# Patient Record
Sex: MALE | Race: WHITE | Employment: OTHER | URBAN - METROPOLITAN AREA
[De-identification: names, ages, dates, MRNs, and addresses within clinical notes are randomized per-mention and may not be internally consistent; named-entity substitution may affect disease eponyms.]

---

## 2017-02-20 DIAGNOSIS — I10 ESSENTIAL HYPERTENSION, BENIGN: ICD-10-CM

## 2017-02-20 NOTE — TELEPHONE ENCOUNTER
Lisinopril      Last Written Prescription Date: 06/29/16  Last Fill Quantity: 90, # refills: 1  Last Office Visit with G, P or [facility] prescribing provider: 12/26/16       Potassium   Date Value Ref Range Status   06/29/2016 4.7 3.4 - 5.3 mmol/L Final     Creatinine   Date Value Ref Range Status   06/29/2016 0.96 0.66 - 1.25 mg/dL Final     BP Readings from Last 3 Encounters:   12/26/16 122/68   11/08/16 124/83   06/29/16 126/72

## 2017-02-22 RX ORDER — LISINOPRIL 10 MG/1
10 TABLET ORAL DAILY
Qty: 90 TABLET | Refills: 1 | Status: SHIPPED | OUTPATIENT
Start: 2017-02-22 | End: 2017-07-05

## 2017-06-28 DIAGNOSIS — K21.9 ESOPHAGEAL REFLUX: ICD-10-CM

## 2017-06-28 NOTE — TELEPHONE ENCOUNTER
Omeprazole      Last Written Prescription Date: 12/16/16  Last Fill Quantity: 90,  # refills: 1   Last Office Visit with G, [facility] or [facility] prescribing provider: 12/26/16

## 2017-06-29 NOTE — TELEPHONE ENCOUNTER
Pt is due for yearly OV.   Call to pt and advised. He agrees.     Scheduled. Prescription approved per [facility] Refill Protocol.

## 2017-07-05 ENCOUNTER — OFFICE VISIT (OUTPATIENT)
Dept: INTERNAL MEDICINE | Facility: CLINIC | Age: 82
End: 2017-07-05
Payer: COMMERCIAL

## 2017-07-05 ENCOUNTER — TELEPHONE (OUTPATIENT)
Dept: INTERNAL MEDICINE | Facility: CLINIC | Age: 82
End: 2017-07-05

## 2017-07-05 VITALS
BODY MASS INDEX: 34.86 KG/M2 | HEART RATE: 82 BPM | DIASTOLIC BLOOD PRESSURE: 60 MMHG | TEMPERATURE: 97.7 F | HEIGHT: 68 IN | WEIGHT: 230 LBS | SYSTOLIC BLOOD PRESSURE: 128 MMHG | OXYGEN SATURATION: 94 %

## 2017-07-05 DIAGNOSIS — E78.5 HYPERLIPIDEMIA LDL GOAL <130: Primary | ICD-10-CM

## 2017-07-05 DIAGNOSIS — K21.9 GASTROESOPHAGEAL REFLUX DISEASE, ESOPHAGITIS PRESENCE NOT SPECIFIED: Primary | ICD-10-CM

## 2017-07-05 DIAGNOSIS — E78.5 HYPERLIPIDEMIA LDL GOAL <130: ICD-10-CM

## 2017-07-05 DIAGNOSIS — I10 ESSENTIAL HYPERTENSION, BENIGN: ICD-10-CM

## 2017-07-05 LAB — HBA1C MFR BLD: 5.8 % (ref 4.3–6)

## 2017-07-05 PROCEDURE — 99214 OFFICE O/P EST MOD 30 MIN: CPT | Performed by: INTERNAL MEDICINE

## 2017-07-05 PROCEDURE — 36415 COLL VENOUS BLD VENIPUNCTURE: CPT | Performed by: INTERNAL MEDICINE

## 2017-07-05 PROCEDURE — 83036 HEMOGLOBIN GLYCOSYLATED A1C: CPT | Mod: GZ | Performed by: INTERNAL MEDICINE

## 2017-07-05 PROCEDURE — 80061 LIPID PANEL: CPT | Performed by: INTERNAL MEDICINE

## 2017-07-05 PROCEDURE — 80053 COMPREHEN METABOLIC PANEL: CPT | Performed by: INTERNAL MEDICINE

## 2017-07-05 RX ORDER — LOVASTATIN 10 MG
10 TABLET ORAL AT BEDTIME
Qty: 90 TABLET | Refills: 3 | Status: SHIPPED | OUTPATIENT
Start: 2017-07-05 | End: 2018-08-13

## 2017-07-05 RX ORDER — LISINOPRIL 10 MG/1
10 TABLET ORAL DAILY
Qty: 90 TABLET | Refills: 3 | Status: SHIPPED | OUTPATIENT
Start: 2017-07-05 | End: 2018-10-03

## 2017-07-05 NOTE — PROGRESS NOTES
SUBJECTIVE:                                                    Lalit Villarreal is a 82 year old male who presents to clinic today for the following health issues:      Hyperlipidemia Follow-Up      Rate your low fat/cholesterol diet?: poor    Taking statin?  Yes, possible muscle aches from statin    Other lipid medications/supplements?:  Fish oil    Hypertension Follow-up      Outpatient blood pressures are not being checked.    Low Salt Diet: not monitoring salt      GERD Follow up; stable on omeprazole 20 mg daily with good symptom relief.        Amount of exercise or physical activity: None    Problems taking medications regularly: No    Medication side effects: none    Diet: regular (no restrictions)         Patient Active Problem List   Diagnosis     Essential hypertension, benign     Osteoarthritis     Seborrheic dermatitis     Mixed hyperlipidemia     Esophageal reflux     Family history of colon cancer     HYPERLIPIDEMIA LDL GOAL <130     Health Care Home     Compression fracture of L3 lumbar vertebra (H)     Advanced directives, counseling/discussion     Pneumonia     Past Surgical History:   Procedure Laterality Date     C NONSPECIFIC PROCEDURE      Cholecystectomy     C NONSPECIFIC PROCEDURE      Vein stripping     HC COLONOSCOPY THRU STOMA, DIAGNOSTIC  01/05    due in 2010     RELEASE CARPAL TUNNEL Right 1/5/2016    Procedure: RELEASE CARPAL TUNNEL;  Surgeon: Kaiden Morris MD;  Location:  OR       Social History   Substance Use Topics     Smoking status: Former Smoker     Smokeless tobacco: Former User      Comment: Quit 30 years ago     Alcohol use No     Family History   Problem Relation Age of Onset     CANCER Brother      lung and colon ca     DIABETES Mother      CEREBROVASCULAR DISEASE Mother      HEART DISEASE Mother      Cancer - colorectal Brother      noncancerous polyps         Current Outpatient Prescriptions   Medication Sig Dispense Refill     omeprazole (PRILOSEC) 20 MG CR  "capsule TAKE 1 CAPSULE BY MOUTH DAILY 90 capsule 0     lisinopril (PRINIVIL/ZESTRIL) 10 MG tablet Take 1 tablet (10 mg) by mouth daily 90 tablet 1     lovastatin (MEVACOR) 10 MG tablet Take 1 tablet (10 mg) by mouth At Bedtime 90 tablet 3     HYDROcodone-acetaminophen (NORCO) 5-325 MG per tablet Take 1-2 tablets by mouth every 4 hours as needed for other (Moderate to Severe Pain) 10 tablet 0     Flaxseed, Linseed, (FLAX SEED OIL) 1000 MG capsule Take 1 capsule by mouth daily       aspirin 81 MG tablet Take by mouth daily       FISH OIL 1000 MG OR CAPS 2 qd  0     GLUCOSAMINE CHONDROITIN OR TABS 2 qd  0       Reviewed and updated as needed this visit by clinical staff  Meds       Reviewed and updated as needed this visit by Provider         ROS:  C: NEGATIVE for fever, chills, change in weight  E/M: NEGATIVE for ear, mouth and throat problems  R: NEGATIVE for significant cough or SOB  CV: NEGATIVE for chest pain, palpitations or peripheral edema  MUSCULOSKELETAL: NEGATIVE for significant arthralgias or myalgia  NEURO: NEGATIVE for weakness, dizziness or paresthesias    OBJECTIVE:                                                    /60  Pulse 82  Temp 97.7  F (36.5  C) (Oral)  Ht 5' 8\" (1.727 m)  Wt 230 lb (104.3 kg)  SpO2 94%  BMI 34.97 kg/m2  Body mass index is 34.97 kg/(m^2).   GENERAL: healthy, alert, well nourished, well hydrated, no distress  EYES: Eyes grossly normal to inspection, extraocular movements - intact, and PERRL  HENT: ear canals- normal; TMs- normal; Nose- normal; Mouth- no ulcers, no lesions  NECK: no tenderness, no adenopathy, no asymmetry, no masses, no stiffness   RESP: lungs clear to auscultation - no rales, no rhonchi, no wheezes  CV: regular rates and rhythm, normal S1 S2, no S3 or S4 and no murmur, no click or rub -  MS: extremities- no gross deformities noted, varicose veins noted. no edema, no calf tenderness  NEURO: strength and tone- normal, sensory exam- grossly normal, " mentation- intact, speech- normal, reflexes- symmetric         ASSESSMENT/PLAN:                                                         (I10) Essential hypertension, benign  Comment:BP well controlled   Plan: lisinopril (PRINIVIL/ZESTRIL) 10 MG tablet once daily refilled.explained clearly about the medication,insructions and side effects.  Check CMP.pt was told I will contact him after results and proceed accordingly.           (E78.5) Hyperlipidemia LDL goal <130  Plan: check Lipid panel, refilled Lovastatin (MEVACOR) 10 MG tablet daily as directed.explained clearly about the medication,insructions and side effects.    (K21.9) Gastroesophageal reflux disease, esophagitis presence not specified    Plan:stable on Omeprazole 20 mg daily.explained clearly about the medication,insructions and side effects including osteopenia, pt understands. .            Roya Johnson MD  [facility]

## 2017-07-05 NOTE — MR AVS SNAPSHOT
"              After Visit Summary   2017    Lalit Villarreal    MRN: [id number]           Patient Information     Date Of Birth          1935        Visit Information        Provider Department      2017 11:00 AM Roya Johnson MD [facility]        Today's Diagnoses     Gastroesophageal reflux disease, esophagitis presence not specified    -  1    Essential hypertension, benign        Hyperlipidemia LDL goal <130           Follow-ups after your visit        Who to contact     If you have questions or need follow up information about today's clinic visit or your schedule please contact [facility] directly at [phone].  Normal or non-critical lab and imaging results will be communicated to you by MyChart, letter or phone within 4 business days after the clinic has received the results. If you do not hear from us within 7 days, please contact the clinic through MyChart or phone. If you have a critical or abnormal lab result, we will notify you by phone as soon as possible.  Submit refill requests through Snapstream or call your pharmacy and they will forward the refill request to us. Please allow 3 business days for your refill to be completed.          Additional Information About Your Visit        MyChart Information     Snapstream lets you send messages to your doctor, view your test results, renew your prescriptions, schedule appointments and more. To sign up, go to [email or web address] . Click on \"Log in\" on the left side of the screen, which will take you to the Welcome page. Then click on \"Sign up Now\" on the right side of the page.     You will be asked to enter the access code listed below, as well as some personal information. Please follow the directions to create your username and password.     Your access code is: [email or web address]  Expires: 10/3/2017 11:30 AM     Your access code will  in 90 days. If you need help or a new code, please call " "your [facility] clinic or [phone].        Care EveryWhere ID     This is your Care EveryWhere ID. This could be used by other organizations to access your [facility] medical records  [id number]        Your Vitals Were     Pulse Temperature Height Pulse Oximetry BMI (Body Mass Index)       82 97.7  F (36.5  C) (Oral) 5' 8\" (1.727 m) 94% 34.97 kg/m2        Blood Pressure from Last 3 Encounters:   07/05/17 128/60   12/26/16 122/68   11/08/16 124/83    Weight from Last 3 Encounters:   07/05/17 230 lb (104.3 kg)   12/26/16 236 lb 11.2 oz (107.4 kg)   06/29/16 232 lb 9.6 oz (105.5 kg)              Today, you had the following     No orders found for display         Where to get your medicines      These medications were sent to [id number] - [place], [place] - [address]  [address], [place] [id number]     Phone:  [phone]     lisinopril 10 MG tablet    lovastatin 10 MG tablet          Primary Care Provider Office Phone # Fax #    Krishnakumari JADEN Johnson [phone]       [facility] [address] [facility] [address]        Equal Access to Services     ANGELA LINARES : Hadii arianna ku hadasho Soomaali, waaxda luqadaha, qaybta kaalmada adeegyada, rehana ha. So [facility] [phone].    ATENCIÓN: Si habla español, tiene a amos disposición servicios gratuitos de asistencia lingüística. Lidia al [phone].    We comply with applicable federal civil rights laws and [place] laws. We do not discriminate on the basis of race, color, national origin, age, disability sex, sexual orientation or gender identity.            Thank you!     Thank you for choosing [facility]  for your care. Our goal is always to provide you with excellent care. Hearing back from our patients is one way we can continue to improve our services. Please take a few minutes to complete the written survey that you may receive in the mail after your " visit with us. Thank you!             Your Updated Medication List - Protect others around you: Learn how to safely use, store and throw away your medicines at www.disposemymeds.org.          This list is accurate as of: 7/5/17 11:30 AM.  Always use your most recent med list.                   Brand Name Dispense Instructions for use Diagnosis    aspirin 81 MG tablet      Take by mouth daily        fish oil-omega-3 fatty acids 1000 MG capsule      2 qd        flax seed oil 1000 MG capsule      Take 1 capsule by mouth daily        GLUCOSAMINE CHONDROITIN Tabs      2 qd        HYDROcodone-acetaminophen 5-325 MG per tablet    NORCO    10 tablet    Take 1-2 tablets by mouth every 4 hours as needed for other (Moderate to Severe Pain)    Postoperative pain       lisinopril 10 MG tablet    PRINIVIL/ZESTRIL    90 tablet    Take 1 tablet (10 mg) by mouth daily    Essential hypertension, benign       lovastatin 10 MG tablet    MEVACOR    90 tablet    Take 1 tablet (10 mg) by mouth At Bedtime    Hyperlipidemia LDL goal <130       omeprazole 20 MG CR capsule    priLOSEC    90 capsule    TAKE 1 CAPSULE BY MOUTH DAILY    Esophageal reflux

## 2017-07-05 NOTE — TELEPHONE ENCOUNTER
Patient has an appt at 11:00 a.m. Today, calling to see if PCP will place lab orders now so that he can come in early and have labs drawn then eat before appt.  KELLEE Lua R.N.

## 2017-07-05 NOTE — NURSING NOTE
"Chief Complaint   Patient presents with     Recheck Medication     F/U on BP, Lipids, meds       Initial /60  Pulse 82  Temp 97.7  F (36.5  C) (Oral)  Ht 5' 8\" (1.727 m)  Wt 230 lb (104.3 kg)  SpO2 94%  BMI 34.97 kg/m2 Estimated body mass index is 34.97 kg/(m^2) as calculated from the following:    Height as of this encounter: 5' 8\" (1.727 m).    Weight as of this encounter: 230 lb (104.3 kg).  Medication Reconciliation: complete   Mickie Jewell MA      "

## 2017-07-06 LAB
ALBUMIN SERPL-MCNC: 3.6 G/DL (ref 3.4–5)
ALP SERPL-CCNC: 56 U/L (ref 40–150)
ALT SERPL W P-5'-P-CCNC: 34 U/L (ref 0–70)
ANION GAP SERPL CALCULATED.3IONS-SCNC: 9 MMOL/L (ref 3–14)
AST SERPL W P-5'-P-CCNC: 22 U/L (ref 0–45)
BILIRUB SERPL-MCNC: 1 MG/DL (ref 0.2–1.3)
BUN SERPL-MCNC: 20 MG/DL (ref 7–30)
CALCIUM SERPL-MCNC: 8.6 MG/DL (ref 8.5–10.1)
CHLORIDE SERPL-SCNC: 108 MMOL/L (ref 94–109)
CHOLEST SERPL-MCNC: 129 MG/DL
CO2 SERPL-SCNC: 25 MMOL/L (ref 20–32)
CREAT SERPL-MCNC: 0.98 MG/DL (ref 0.66–1.25)
GFR SERPL CREATININE-BSD FRML MDRD: 74 ML/MIN/1.7M2
GLUCOSE SERPL-MCNC: 107 MG/DL (ref 70–99)
HDLC SERPL-MCNC: 42 MG/DL
LDLC SERPL CALC-MCNC: 68 MG/DL
NONHDLC SERPL-MCNC: 87 MG/DL
POTASSIUM SERPL-SCNC: 4.5 MMOL/L (ref 3.4–5.3)
PROT SERPL-MCNC: 6.5 G/DL (ref 6.8–8.8)
SODIUM SERPL-SCNC: 142 MMOL/L (ref 133–144)
TRIGL SERPL-MCNC: 95 MG/DL

## 2017-07-24 ENCOUNTER — OFFICE VISIT (OUTPATIENT)
Dept: INTERNAL MEDICINE | Facility: CLINIC | Age: 82
End: 2017-07-24
Payer: COMMERCIAL

## 2017-07-24 VITALS
SYSTOLIC BLOOD PRESSURE: 120 MMHG | HEART RATE: 91 BPM | DIASTOLIC BLOOD PRESSURE: 62 MMHG | BODY MASS INDEX: 34.49 KG/M2 | WEIGHT: 227.6 LBS | OXYGEN SATURATION: 94 % | HEIGHT: 68 IN | TEMPERATURE: 99.1 F

## 2017-07-24 DIAGNOSIS — J06.9 UPPER RESPIRATORY TRACT INFECTION, UNSPECIFIED TYPE: Primary | ICD-10-CM

## 2017-07-24 PROCEDURE — 99213 OFFICE O/P EST LOW 20 MIN: CPT | Performed by: NURSE PRACTITIONER

## 2017-07-24 RX ORDER — FLUTICASONE PROPIONATE 50 MCG
1-2 SPRAY, SUSPENSION (ML) NASAL DAILY
Qty: 1 BOTTLE | Refills: 11 | Status: SHIPPED | OUTPATIENT
Start: 2017-07-24 | End: 2019-05-24

## 2017-07-24 NOTE — PROGRESS NOTES
SUBJECTIVE:                                                    Lalit Villarreal is a 82 year old male who presents to clinic today for the following health issues:      RESPIRATORY SYMPTOMS      Duration: 4 DAYS    Description  nasal congestion, facial pain/pressure, cough and hoarse voice    Severity: moderate    Accompanying signs and symptoms: None    History (predisposing factors):  none    Precipitating or alleviating factors: recent travel, poor sleep    Therapies tried and outcome:  oral decongestant        Patient Active Problem List   Diagnosis     Essential hypertension, benign     Osteoarthritis     Seborrheic dermatitis     Mixed hyperlipidemia     Esophageal reflux     Family history of colon cancer     HYPERLIPIDEMIA LDL GOAL <130     Health Care Home     Compression fracture of L3 lumbar vertebra (H)     Advanced directives, counseling/discussion     Pneumonia     Gastroesophageal reflux disease, esophagitis presence not specified     Past Surgical History:   Procedure Laterality Date     C NONSPECIFIC PROCEDURE      Cholecystectomy     C NONSPECIFIC PROCEDURE      Vein stripping     HC COLONOSCOPY THRU STOMA, DIAGNOSTIC  01/05    due in 2010     RELEASE CARPAL TUNNEL Right 1/5/2016    Procedure: RELEASE CARPAL TUNNEL;  Surgeon: Kaiden Morris MD;  Location:  OR       Social History   Substance Use Topics     Smoking status: Former Smoker     Smokeless tobacco: Former User      Comment: Quit 30 years ago     Alcohol use No     Family History   Problem Relation Age of Onset     CANCER Brother      lung and colon ca     DIABETES Mother      CEREBROVASCULAR DISEASE Mother      HEART DISEASE Mother      Cancer - colorectal Brother      noncancerous polyps         Current Outpatient Prescriptions   Medication Sig Dispense Refill     fluticasone (FLONASE) 50 MCG/ACT spray Spray 1-2 sprays into both nostrils daily 1 Bottle 11     lisinopril (PRINIVIL/ZESTRIL) 10 MG tablet Take 1 tablet (10 mg)  "by mouth daily 90 tablet 3     lovastatin (MEVACOR) 10 MG tablet Take 1 tablet (10 mg) by mouth At Bedtime 90 tablet 3     omeprazole (PRILOSEC) 20 MG CR capsule TAKE 1 CAPSULE BY MOUTH DAILY 90 capsule 0     HYDROcodone-acetaminophen (NORCO) 5-325 MG per tablet Take 1-2 tablets by mouth every 4 hours as needed for other (Moderate to Severe Pain) 10 tablet 0     Flaxseed, Linseed, (FLAX SEED OIL) 1000 MG capsule Take 1 capsule by mouth daily       aspirin 81 MG tablet Take by mouth daily       FISH OIL 1000 MG OR CAPS 2 qd  0     GLUCOSAMINE CHONDROITIN OR TABS 2 qd  0     BP Readings from Last 3 Encounters:   07/24/17 120/62   07/05/17 128/60   12/26/16 122/68    Wt Readings from Last 3 Encounters:   07/24/17 227 lb 9.6 oz (103.2 kg)   07/05/17 230 lb (104.3 kg)   12/26/16 236 lb 11.2 oz (107.4 kg)                        Reviewed and updated as needed this visit by clinical staffTobacco  Allergies  Meds  Med Hx  Surg Hx  Fam Hx  Soc Hx      Reviewed and updated as needed this visit by Provider         ROS:  C: NEGATIVE for fever, chills, change in weight  E/M: NEGATIVE for ear, mouth and throat problems  RESP:NEGATIVE for SOB/dyspnea and wheezing  CV: NEGATIVE for chest pain, palpitations or peripheral edema    OBJECTIVE:     /62 (BP Location: Right arm, Patient Position: Sitting, Cuff Size: Adult Large)  Pulse 91  Temp 99.1  F (37.3  C) (Oral)  Ht 5' 8\" (1.727 m)  Wt 227 lb 9.6 oz (103.2 kg)  SpO2 94%  BMI 34.61 kg/m2  Body mass index is 34.61 kg/(m^2).  GENERAL: no distress, obese and elderly  HENT: ear canals and TM's normal, nose and mouth without ulcers or lesions  HENT: nasal mucosa edematous  and sinuses: not tender  NECK: no adenopathy, no asymmetry, masses, or scars and thyroid normal to palpation  RESP: lungs clear to auscultation - no rales, rhonchi or wheezes  CV: regular rate and rhythm, normal S1 S2, no S3 or S4, no murmur, click or rub, no peripheral edema and peripheral pulses " strong        ASSESSMENT/PLAN:               ICD-10-CM    1. Upper respiratory tract infection, unspecified type J06.9 fluticasone (FLONASE) 50 MCG/ACT spray       Avoid decongestants with HTN  Home cares for viral URI sx.  Call if not improving >10 days    Lillie Wong, MONCHO  [facility]

## 2017-07-24 NOTE — MR AVS SNAPSHOT
"              After Visit Summary   2017    Lalit Villarreal    MRN: [id number]           Patient Information     Date Of Birth          1935        Visit Information        Provider Department      2017 2:00 PM Lillie Wong NP [facility]        Today's Diagnoses     Upper respiratory tract infection, unspecified type    -  1       Follow-ups after your visit        Who to contact     If you have questions or need follow up information about today's clinic visit or your schedule please contact [facility] directly at [phone].  Normal or non-critical lab and imaging results will be communicated to you by Cogbookshart, letter or phone within 4 business days after the clinic has received the results. If you do not hear from us within 7 days, please contact the clinic through Cogbookshart or phone. If you have a critical or abnormal lab result, we will notify you by phone as soon as possible.  Submit refill requests through eBureau or call your pharmacy and they will forward the refill request to us. Please allow 3 business days for your refill to be completed.          Additional Information About Your Visit        MyChart Information     eBureau lets you send messages to your doctor, view your test results, renew your prescriptions, schedule appointments and more. To sign up, go to [email or web address] . Click on \"Log in\" on the left side of the screen, which will take you to the Welcome page. Then click on \"Sign up Now\" on the right side of the page.     You will be asked to enter the access code listed below, as well as some personal information. Please follow the directions to create your username and password.     Your access code is: [email or web address]  Expires: 10/3/2017 11:30 AM     Your access code will  in 90 days. If you need help or a new code, please call your [facility] or [phone].        Care EveryWhere ID     This is your Care EveryWhere " "ID. This could be used by other organizations to access your [facility] medical records  [id number]        Your Vitals Were     Pulse Temperature Height Pulse Oximetry BMI (Body Mass Index)       91 99.1  F (37.3  C) (Oral) 5' 8\" (1.727 m) 94% 34.61 kg/m2        Blood Pressure from Last 3 Encounters:   07/24/17 120/62   07/05/17 128/60   12/26/16 122/68    Weight from Last 3 Encounters:   07/24/17 227 lb 9.6 oz (103.2 kg)   07/05/17 230 lb (104.3 kg)   12/26/16 236 lb 11.2 oz (107.4 kg)              Today, you had the following     No orders found for display         Today's Medication Changes          These changes are accurate as of: 7/24/17  2:50 PM.  If you have any questions, ask your nurse or doctor.               Start taking these medicines.        Dose/Directions    fluticasone 50 MCG/ACT spray   Commonly known as:  FLONASE   Used for:  Upper respiratory tract infection, unspecified type   Started by:  Lillie Wong NP        Dose:  1-2 spray   Spray 1-2 sprays into both nostrils daily   Quantity:  1 Bottle   Refills:  11            Where to get your medicines      These medications were sent to [id number] - [facility] [address] [place] [address] [address]  [address] [place] [address]. [address] [place], [place] [id number]     Phone:  [phone]     fluticasone 50 MCG/ACT spray                Primary Care Provider Office Phone # Fax #    Krishnakumari JADEN Johnson [phone]       [facility] [address] [place] [address]        Equal Access to Services     ANGELA LINARES AH: Edith Lovell, buddy wheeler, rehana stock. So [facility] [phone].    ATENCIÓN: Si habla español, tiene a amos disposición servicios gratuitos de asistencia lingüística. Llame al [phone].    We comply with applicable federal civil rights laws and [place] laws. We do not discriminate on the basis of race, color, national origin, age, " disability sex, sexual orientation or gender identity.            Thank you!     Thank you for choosing [facility]  for your care. Our goal is always to provide you with excellent care. Hearing back from our patients is one way we can continue to improve our services. Please take a few minutes to complete the written survey that you may receive in the mail after your visit with us. Thank you!             Your Updated Medication List - Protect others around you: Learn how to safely use, store and throw away your medicines at www.disposemymeds.org.          This list is accurate as of: 7/24/17  2:50 PM.  Always use your most recent med list.                   Brand Name Dispense Instructions for use Diagnosis    aspirin 81 MG tablet      Take by mouth daily        fish oil-omega-3 fatty acids 1000 MG capsule      2 qd        flax seed oil 1000 MG capsule      Take 1 capsule by mouth daily        fluticasone 50 MCG/ACT spray    FLONASE    1 Bottle    Spray 1-2 sprays into both nostrils daily    Upper respiratory tract infection, unspecified type       GLUCOSAMINE CHONDROITIN Tabs      2 qd        HYDROcodone-acetaminophen 5-325 MG per tablet    NORCO    10 tablet    Take 1-2 tablets by mouth every 4 hours as needed for other (Moderate to Severe Pain)    Postoperative pain       lisinopril 10 MG tablet    PRINIVIL/ZESTRIL    90 tablet    Take 1 tablet (10 mg) by mouth daily    Essential hypertension, benign       lovastatin 10 MG tablet    MEVACOR    90 tablet    Take 1 tablet (10 mg) by mouth At Bedtime    Hyperlipidemia LDL goal <130       omeprazole 20 MG CR capsule    priLOSEC    90 capsule    TAKE 1 CAPSULE BY MOUTH DAILY    Esophageal reflux

## 2017-09-25 ENCOUNTER — TRANSFERRED RECORDS (OUTPATIENT)
Dept: HEALTH INFORMATION MANAGEMENT | Facility: CLINIC | Age: 82
End: 2017-09-25

## 2017-10-10 DIAGNOSIS — K21.9 ESOPHAGEAL REFLUX: ICD-10-CM

## 2017-10-10 NOTE — TELEPHONE ENCOUNTER
Omeprazole       Last Written Prescription Date: 6/29/17  Last Fill Quantity: 90,  # refills: 0   Last Office Visit with [facility], [facility] or [facility] prescribing provider: 7/5/17 Juan

## 2018-04-26 DIAGNOSIS — K21.9 ESOPHAGEAL REFLUX: ICD-10-CM

## 2018-04-26 NOTE — TELEPHONE ENCOUNTER
"    Requested Prescriptions   Pending Prescriptions Disp Refills     omeprazole (PRILOSEC) 20 MG CR capsule [Pharmacy Med Name: Omeprazole Oral Capsule Delayed Release 20 MG] 90 capsule 0     Sig: TAKE ONE CAPSULE BY MOUTH ONE TIME DAILY    PPI Protocol Passed    4/26/2018 10:23 AM       Passed - Not on Clopidogrel (unless Pantoprazole ordered)       Passed - No diagnosis of osteoporosis on record       Passed - Recent (12 mo) or future (30 days) visit within the authorizing provider's specialty    Patient had office visit in the last 12 months or has a visit in the next 30 days with authorizing provider or within the authorizing provider's specialty.  See \"Patient Info\" tab in inbasket, or \"Choose Columns\" in Meds & Orders section of the refill encounter.           Passed - Patient is age 18 or older          "

## 2018-08-05 DIAGNOSIS — K21.9 ESOPHAGEAL REFLUX: ICD-10-CM

## 2018-08-06 NOTE — TELEPHONE ENCOUNTER
"Requested Prescriptions   Pending Prescriptions Disp Refills     omeprazole (PRILOSEC) 20 MG CR capsule [Pharmacy Med Name: Omeprazole Oral Capsule Delayed Release 20 MG]  Last Written Prescription Date:  4/26/2018  Last Fill Quantity: 90,  # refills: 0   Last office visit: 7/24/2017 with prescribing provider:     Future Office Visit:   90 capsule 0     Sig: TAKE ONE CAPSULE BY MOUTH ONE TIME DAILY    PPI Protocol Failed    8/5/2018 11:35 AM       Failed - Recent (12 mo) or future (30 days) visit within the authorizing provider's specialty    Patient had office visit in the last 12 months or has a visit in the next 30 days with authorizing provider or within the authorizing provider's specialty.  See \"Patient Info\" tab in inbasket, or \"Choose Columns\" in Meds & Orders section of the refill encounter.           Passed - Not on Clopidogrel (unless Pantoprazole ordered)       Passed - No diagnosis of osteoporosis on record       Passed - Patient is age 18 or older        "

## 2018-08-13 DIAGNOSIS — E78.5 HYPERLIPIDEMIA LDL GOAL <130: ICD-10-CM

## 2018-08-13 NOTE — TELEPHONE ENCOUNTER
"Requested Prescriptions   Pending Prescriptions Disp Refills     lovastatin (MEVACOR) 10 MG tablet [Pharmacy Med Name: Lovastatin Oral Tablet 10 MG]  Last Written Prescription Date:  7/5/2017  Last Fill Quantity: 90,  # refills: 3   Last office visit: 7/24/2017 with prescribing provider:     Future Office Visit:   90 tablet 2     Sig: TAKE 1 TABLET (10 MG) BY MOUTH AT BEDTIME    Statins Protocol Failed    8/13/2018  7:00 AM       Failed - LDL on file in past 12 months    Recent Labs   Lab Test  07/05/17   0927   LDL  68            Failed - Recent (12 mo) or future (30 days) visit within the authorizing provider's specialty    Patient had office visit in the last 12 months or has a visit in the next 30 days with authorizing provider or within the authorizing provider's specialty.  See \"Patient Info\" tab in inbasket, or \"Choose Columns\" in Meds & Orders section of the refill encounter.           Passed - No abnormal creatine kinase in past 12 months    No lab results found.            Passed - Patient is age 18 or older        "

## 2018-08-13 NOTE — LETTER
[facility]  [address], [address]  [place], [place]  [address]                                            TEL:[phone]  FAX:[phone]      Lalit Villarreal  [address] [place] [address]   [place] [address]      August 15, 2018    Dear Lalit     We have received a refill request from your pharmacy, however, we were only able to provide a one time fill because you are over-due for an appointment. Please call [phone] to schedule an appointment before you are due for your next refill.      Sincerely,  ANNE-MARIE Bishop--triage nurse

## 2018-08-15 RX ORDER — LOVASTATIN 10 MG
TABLET ORAL
Qty: 90 TABLET | Refills: 0 | Status: SHIPPED | OUTPATIENT
Start: 2018-08-15

## 2018-08-15 NOTE — TELEPHONE ENCOUNTER
Medication is being filled for 1 time refill only due to:  Patient needs to be seen because it has been more than one year since last visit.     Letter mailed.

## 2018-10-03 DIAGNOSIS — I10 ESSENTIAL HYPERTENSION, BENIGN: ICD-10-CM

## 2018-10-03 NOTE — TELEPHONE ENCOUNTER
"Requested Prescriptions   Pending Prescriptions Disp Refills     lisinopril (PRINIVIL/ZESTRIL) 10 MG tablet [Pharmacy Med Name: Lisinopril Oral Tablet 10 MG] 90 tablet 2    Last Written Prescription Date:  07/05/2017  Last Fill Quantity: 90,  # refills: 3   Last office visit: 7/24/2017 with prescribing provider:     Future Office Visit:   Sig: TAKE 1 TABLET (10 MG) BY MOUTH DAILY    ACE Inhibitors (Including Combos) Protocol Failed    10/3/2018  7:00 AM       Failed - Blood pressure under 140/90 in past 12 months    BP Readings from Last 3 Encounters:   07/24/17 120/62   07/05/17 128/60   12/26/16 122/68                Failed - Recent (12 mo) or future (30 days) visit within the authorizing provider's specialty    Patient had office visit in the last 12 months or has a visit in the next 30 days with authorizing provider or within the authorizing provider's specialty.  See \"Patient Info\" tab in inbasket, or \"Choose Columns\" in Meds & Orders section of the refill encounter.           Failed - Normal serum creatinine on file in past 12 months    Recent Labs   Lab Test  07/05/17   0927   CR  0.98            Failed - Normal serum potassium on file in past 12 months    Recent Labs   Lab Test  07/05/17   0927   POTASSIUM  4.5            Passed - Patient is age 18 or older        "

## 2018-10-04 RX ORDER — LISINOPRIL 10 MG/1
TABLET ORAL
Qty: 30 TABLET | Refills: 0 | Status: SHIPPED | OUTPATIENT
Start: 2018-10-04

## 2019-05-08 ENCOUNTER — HOSPITAL ENCOUNTER (INPATIENT)
Facility: CLINIC | Age: 84
Setting detail: SURGERY ADMIT
End: 2019-05-08
Attending: ORTHOPAEDIC SURGERY | Admitting: ORTHOPAEDIC SURGERY
Payer: COMMERCIAL

## 2019-05-08 ENCOUNTER — MEDICAL CORRESPONDENCE (OUTPATIENT)
Dept: HEALTH INFORMATION MANAGEMENT | Facility: CLINIC | Age: 84
End: 2019-05-08

## 2019-05-08 DIAGNOSIS — Z00.00 HEALTHCARE MAINTENANCE: Primary | ICD-10-CM

## 2019-05-29 ENCOUNTER — HOSPITAL ENCOUNTER (OUTPATIENT)
Facility: CLINIC | Age: 84
Discharge: HOME | End: 2019-05-29
Attending: INTERNAL MEDICINE | Admitting: INTERNAL MEDICINE
Payer: COMMERCIAL

## 2019-05-29 VITALS
RESPIRATION RATE: 12 BRPM | HEART RATE: 63 BPM | OXYGEN SATURATION: 98 % | SYSTOLIC BLOOD PRESSURE: 119 MMHG | DIASTOLIC BLOOD PRESSURE: 59 MMHG

## 2019-05-29 LAB — COLONOSCOPY: NORMAL

## 2019-05-29 PROCEDURE — G0104 CA SCREEN;FLEXI SIGMOIDSCOPE: HCPCS | Performed by: INTERNAL MEDICINE

## 2019-05-29 PROCEDURE — 45378 DIAGNOSTIC COLONOSCOPY: CPT | Performed by: INTERNAL MEDICINE

## 2019-05-29 PROCEDURE — 25000128 H RX IP 250 OP 636: Performed by: INTERNAL MEDICINE

## 2019-05-29 PROCEDURE — 40000104 ZZH STATISTIC MODERATE SEDATION < 10 MIN: Performed by: INTERNAL MEDICINE

## 2019-05-29 RX ORDER — LIDOCAINE 40 MG/G
CREAM TOPICAL
Status: DISCONTINUED | OUTPATIENT
Start: 2019-05-29 | End: 2019-05-29 | Stop reason: HOSPADM

## 2019-05-29 RX ORDER — ONDANSETRON 2 MG/ML
4 INJECTION INTRAMUSCULAR; INTRAVENOUS
Status: CANCELLED | OUTPATIENT
Start: 2019-05-29

## 2019-05-29 RX ORDER — ONDANSETRON 2 MG/ML
4 INJECTION INTRAMUSCULAR; INTRAVENOUS
Status: DISCONTINUED | OUTPATIENT
Start: 2019-05-29 | End: 2019-05-29 | Stop reason: HOSPADM

## 2019-05-29 RX ORDER — FLUMAZENIL 0.1 MG/ML
0.2 INJECTION, SOLUTION INTRAVENOUS
Status: DISCONTINUED | OUTPATIENT
Start: 2019-05-29 | End: 2019-05-29 | Stop reason: HOSPADM

## 2019-05-29 RX ORDER — FENTANYL CITRATE 50 UG/ML
INJECTION, SOLUTION INTRAMUSCULAR; INTRAVENOUS PRN
Status: DISCONTINUED | OUTPATIENT
Start: 2019-05-29 | End: 2019-05-29 | Stop reason: HOSPADM

## 2019-05-29 RX ORDER — ONDANSETRON 2 MG/ML
4 INJECTION INTRAMUSCULAR; INTRAVENOUS EVERY 6 HOURS PRN
Status: DISCONTINUED | OUTPATIENT
Start: 2019-05-29 | End: 2019-05-29 | Stop reason: HOSPADM

## 2019-05-29 RX ORDER — LIDOCAINE 40 MG/G
CREAM TOPICAL
Status: CANCELLED | OUTPATIENT
Start: 2019-05-29

## 2019-05-29 RX ORDER — ONDANSETRON 4 MG/1
4 TABLET, ORALLY DISINTEGRATING ORAL EVERY 6 HOURS PRN
Status: DISCONTINUED | OUTPATIENT
Start: 2019-05-29 | End: 2019-05-29 | Stop reason: HOSPADM

## 2019-05-29 RX ORDER — NALOXONE HYDROCHLORIDE 0.4 MG/ML
.1-.4 INJECTION, SOLUTION INTRAMUSCULAR; INTRAVENOUS; SUBCUTANEOUS
Status: DISCONTINUED | OUTPATIENT
Start: 2019-05-29 | End: 2019-05-29 | Stop reason: HOSPADM

## 2019-05-29 NOTE — DISCHARGE INSTRUCTIONS
Understanding Diverticulosis and Diverticulitis     Pouches or diverticula usually occur in the lower part of the colon called the sigmoid.      Diverticulitis occurs when the pouches become inflamed.     The colon (large intestine) is the last part of the digestive tract. It absorbs water from stool and changes it from a liquid to a solid. In certain cases, small pouches called diverticula can form in the colon wall. This condition is called diverticulosis. The pouches can become infected. If this happens, it becomes a more serious problem called diverticulitis. These problems can be painful. But they can be managed.   Managing Your Condition  Diet changes or taking medications are often tried first. These may be enough to bring relief. If the case is bad, surgery may be done. You and your doctor can discuss the plan that is best for you.  If You Have Diverticulosis  Diet changes are often enough to control symptoms. The main changes are adding fiber (roughage) and drinking more water. Fiber absorbs water as it travels through your colon. This helps your stool stay soft and move smoothly. Water helps this process. If needed, you may be told to take over-the-counter stool softeners. To help relieve pain, antispasmodic medications may be prescribed.  If You Have Diverticulitis  Treatment depends on how bad your symptoms are.  For mild symptoms: You may be put on a liquid diet for a short time. You may also be prescribed antibiotics. If these two steps relieve your symptoms, you may then be prescribed a high-fiber diet. If you still have symptoms, your doctor will discuss further treatment options with you.  For severe symptoms: You may need to be admitted to the hospital. There, you can be given IV antibiotics and fluids. Once symptoms are under control, the above treatments may be tried. If these don t control your condition, your doctor may discuss the option of having surgery with you.  [place] [facility] Colon  Health  Help keep your colon healthy with a diet that includes plenty of high-fiber fruits, vegetables, and whole grains. Drink plenty of liquids like water and juice. Your doctor may also recommend avoiding seeds and nuts.          [id number] Guido Lipscomb, [address] [place] [address], [place] [address]. All rights reserved. This information is not intended as a substitute for professional medical care. Always follow your healthcare professional's instructions.

## 2019-05-29 NOTE — PROGRESS NOTES
[facility]  Pre-Endoscopy History and Physical     Lalit Villarreal MRN# [id number]   YOB: 1935 Age: 84 year old   Today's Date: 05/29/2019    Date of Procedure: 5/29/2019  Primary care provider: Kati Kennedy  Type of Endoscopy: colonoscopy  Reason for Procedure: FmHx colon cancer  Type of Anesthesia Anticipated: Moderate (conscious) sedation    HPI:    Lalit is a 84 year old male who will be undergoing the above procedure.      A history and physical has been performed. The patient's medications and allergies have been reviewed. The risks and benefits of the procedure and the sedation options and risks were discussed with the patient.  All questions were answered and informed consent was obtained.      Allergies   Allergen Reactions     Celebrex [Celecoxib] GI Disturbance        Current Facility-Administered Medications   Medication     0.9% sodium chloride BOLUS     lidocaine (LMX4) kit     lidocaine 1 % 0.1-1 mL     ondansetron (ZOFRAN) injection 4 mg     sodium chloride (PF) 0.9% PF flush 3 mL     sodium chloride (PF) 0.9% PF flush 3 mL     sodium chloride (PF) 0.9% PF flush 3 mL       Patient Active Problem List   Diagnosis     Essential hypertension, benign     Osteoarthritis     Seborrheic dermatitis     Mixed hyperlipidemia     Esophageal reflux     Family history of colon cancer     HYPERLIPIDEMIA LDL GOAL <130     Health Care Home     Compression fracture of L3 lumbar vertebra (H)     Advanced directives, counseling/discussion     Pneumonia     Gastroesophageal reflux disease, esophagitis presence not specified        Past Medical History:   Diagnosis Date     Benign neoplasm of other specified sites of skin     DERM     Essential hypertension, benign      Family history of colon cancer      GERD (gastroesophageal reflux disease)      Mixed hyperlipidemia      Osteoarthrosis, unspecified whether generalized or localized, unspecified site      Seborrheic dermatitis,  "unspecified     follows up with DERM     Sleep apnea     uses CPAP        Past Surgical History:   Procedure Laterality Date     C NONSPECIFIC PROCEDURE      Cholecystectomy     C NONSPECIFIC PROCEDURE      Vein stripping     HC COLONOSCOPY THRU STOMA, DIAGNOSTIC  01/05    due in 2010     RELEASE CARPAL TUNNEL Right 1/5/2016    Procedure: RELEASE CARPAL TUNNEL;  Surgeon: Kaiden Morris MD;  Location:  OR       Social History     Tobacco Use     Smoking status: Former Smoker     Smokeless tobacco: Former User     Tobacco comment: Quit 30 years ago   Substance Use Topics     Alcohol use: No     Alcohol/week: 0.0 oz       Family History   Problem Relation Age of Onset     Cancer Brother         lung and colon ca     Diabetes Mother      Cerebrovascular Disease Mother      Heart Disease Mother      Cancer - colorectal Brother         noncancerous polyps         PHYSICAL EXAM:   /66   Pulse 64   Resp 16   SpO2 98%  Estimated body mass index is 34.61 kg/m  as calculated from the following:    Height as of 7/24/17: 1.727 m (5' 8\").    Weight as of 7/24/17: 103.2 kg (227 lb 9.6 oz).   RESP: lungs clear to auscultation - no rales, rhonchi or wheezes  CV: regular rates and rhythm    IMPRESSION   ASA Class 3 - Severe systemic disease, but not incapacitating  Mallampati Score: 2      Nathanael Hernandez MD                  "

## 2019-06-10 ENCOUNTER — HOSPITAL ENCOUNTER (OUTPATIENT)
Dept: LAB | Facility: CLINIC | Age: 84
Discharge: HOME | End: 2019-06-10
Attending: ORTHOPAEDIC SURGERY | Admitting: ORTHOPAEDIC SURGERY
Payer: COMMERCIAL

## 2019-06-10 DIAGNOSIS — Z01.812 PRE-OPERATIVE LABORATORY EXAMINATION: ICD-10-CM

## 2019-06-10 LAB
MRSA DNA SPEC QL NAA+PROBE: NEGATIVE
SPECIMEN SOURCE: NORMAL

## 2019-06-10 PROCEDURE — 40000829 ZZHCL STATISTIC STAPH AUREUS SUSCEPT SCREEN PCR: Performed by: ORTHOPAEDIC SURGERY

## 2019-06-10 PROCEDURE — 40000830 ZZHCL STATISTIC STAPH AUREUS METH RESIST SCREEN PCR: Performed by: ORTHOPAEDIC SURGERY

## 2019-06-10 PROCEDURE — 87641 MR-STAPH DNA AMP PROBE: CPT | Performed by: ORTHOPAEDIC SURGERY

## 2019-06-10 PROCEDURE — 87640 STAPH A DNA AMP PROBE: CPT | Performed by: ORTHOPAEDIC SURGERY

## 2019-06-13 RX ORDER — CELECOXIB 200 MG/1
400 CAPSULE ORAL ONCE
Status: CANCELLED | OUTPATIENT
Start: 2019-06-13 | End: 2019-06-13

## 2019-06-13 RX ORDER — CEFAZOLIN SODIUM 1 G/3ML
1 INJECTION, POWDER, FOR SOLUTION INTRAMUSCULAR; INTRAVENOUS SEE ADMIN INSTRUCTIONS
Status: CANCELLED | OUTPATIENT
Start: 2019-06-13

## 2019-06-13 RX ORDER — PREGABALIN 75 MG/1
75 CAPSULE ORAL DAILY
Status: CANCELLED | OUTPATIENT
Start: 2019-06-13 | End: 2019-06-14

## 2019-06-13 RX ORDER — ACETAMINOPHEN 500 MG
1000 TABLET ORAL ONCE
Status: CANCELLED | OUTPATIENT
Start: 2019-06-13 | End: 2019-06-13

## 2019-06-13 RX ORDER — CEFAZOLIN SODIUM 2 G/100ML
2 INJECTION, SOLUTION INTRAVENOUS
Status: CANCELLED | OUTPATIENT
Start: 2019-06-13

## 2019-06-19 ENCOUNTER — TRANSFERRED RECORDS (OUTPATIENT)
Dept: HEALTH INFORMATION MANAGEMENT | Facility: CLINIC | Age: 84
End: 2019-06-19

## 2019-09-05 ENCOUNTER — ANESTHESIA EVENT (OUTPATIENT)
Dept: SURGERY | Facility: CLINIC | Age: 84
DRG: 470 | End: 2019-09-05
Payer: COMMERCIAL

## 2019-09-05 ENCOUNTER — APPOINTMENT (OUTPATIENT)
Dept: GENERAL RADIOLOGY | Facility: CLINIC | Age: 84
DRG: 470 | End: 2019-09-05
Attending: ORTHOPAEDIC SURGERY
Payer: COMMERCIAL

## 2019-09-05 ENCOUNTER — HOSPITAL ENCOUNTER (INPATIENT)
Facility: CLINIC | Age: 84
LOS: 1 days | Discharge: HOME | DRG: 470 | End: 2019-09-06
Attending: ORTHOPAEDIC SURGERY | Admitting: ORTHOPAEDIC SURGERY
Payer: COMMERCIAL

## 2019-09-05 ENCOUNTER — ANESTHESIA (OUTPATIENT)
Dept: SURGERY | Facility: CLINIC | Age: 84
DRG: 470 | End: 2019-09-05
Payer: COMMERCIAL

## 2019-09-05 DIAGNOSIS — Z96.641 AFTERCARE FOLLOWING RIGHT HIP JOINT REPLACEMENT SURGERY: Primary | ICD-10-CM

## 2019-09-05 DIAGNOSIS — Z47.1 AFTERCARE FOLLOWING RIGHT HIP JOINT REPLACEMENT SURGERY: Primary | ICD-10-CM

## 2019-09-05 LAB
ABO + RH BLD: NORMAL
ABO + RH BLD: NORMAL
BLD GP AB SCN SERPL QL: NORMAL
BLOOD BANK CMNT PATIENT-IMP: NORMAL
CREAT SERPL-MCNC: 0.89 MG/DL (ref 0.66–1.25)
GFR SERPL CREATININE-BSD FRML MDRD: 79 ML/MIN/{1.73_M2}
HGB BLD-MCNC: 13.1 G/DL (ref 13.3–17.7)
POTASSIUM SERPL-SCNC: 4.4 MMOL/L (ref 3.4–5.3)
SPECIMEN EXP DATE BLD: NORMAL

## 2019-09-05 PROCEDURE — 71000012 ZZH RECOVERY PHASE 1 LEVEL 1 FIRST HR: Performed by: ORTHOPAEDIC SURGERY

## 2019-09-05 PROCEDURE — 93005 ELECTROCARDIOGRAM TRACING: CPT

## 2019-09-05 PROCEDURE — 25800030 ZZH RX IP 258 OP 636: Performed by: NURSE ANESTHETIST, CERTIFIED REGISTERED

## 2019-09-05 PROCEDURE — 37000008 ZZH ANESTHESIA TECHNICAL FEE, 1ST 30 MIN: Performed by: ORTHOPAEDIC SURGERY

## 2019-09-05 PROCEDURE — 40000985 XR PELVIS AD HIP PORTABLE RIGHT 1 VIEW

## 2019-09-05 PROCEDURE — 25000128 H RX IP 250 OP 636: Performed by: PHYSICIAN ASSISTANT

## 2019-09-05 PROCEDURE — 99221 1ST HOSP IP/OBS SF/LOW 40: CPT | Performed by: NURSE PRACTITIONER

## 2019-09-05 PROCEDURE — 27210794 ZZH OR GENERAL SUPPLY STERILE: Performed by: ORTHOPAEDIC SURGERY

## 2019-09-05 PROCEDURE — 25800030 ZZH RX IP 258 OP 636: Performed by: ANESTHESIOLOGY

## 2019-09-05 PROCEDURE — 25000132 ZZH RX MED GY IP 250 OP 250 PS 637: Performed by: ORTHOPAEDIC SURGERY

## 2019-09-05 PROCEDURE — 36000065 ZZH SURGERY LEVEL 4 W FLUORO 1ST 30 MIN: Performed by: ORTHOPAEDIC SURGERY

## 2019-09-05 PROCEDURE — 12000000 ZZH R&B MED SURG/OB

## 2019-09-05 PROCEDURE — 36000063 ZZH SURGERY LEVEL 4 EA 15 ADDTL MIN: Performed by: ORTHOPAEDIC SURGERY

## 2019-09-05 PROCEDURE — C1713 ANCHOR/SCREW BN/BN,TIS/BN: HCPCS | Performed by: ORTHOPAEDIC SURGERY

## 2019-09-05 PROCEDURE — 40000170 ZZH STATISTIC PRE-PROCEDURE ASSESSMENT II: Performed by: ORTHOPAEDIC SURGERY

## 2019-09-05 PROCEDURE — 0SR903A REPLACEMENT OF RIGHT HIP JOINT WITH CERAMIC SYNTHETIC SUBSTITUTE, UNCEMENTED, OPEN APPROACH: ICD-10-PCS | Performed by: ORTHOPAEDIC SURGERY

## 2019-09-05 PROCEDURE — 25000128 H RX IP 250 OP 636: Performed by: NURSE ANESTHETIST, CERTIFIED REGISTERED

## 2019-09-05 PROCEDURE — 36415 COLL VENOUS BLD VENIPUNCTURE: CPT | Performed by: PHYSICIAN ASSISTANT

## 2019-09-05 PROCEDURE — 40000277 XR SURGERY CARM FLUORO LESS THAN 5 MIN W STILLS

## 2019-09-05 PROCEDURE — 99207 ZZC CONSULT E&M CHANGED TO INITIAL LEVEL: CPT | Performed by: NURSE PRACTITIONER

## 2019-09-05 PROCEDURE — 93010 ELECTROCARDIOGRAM REPORT: CPT | Performed by: INTERNAL MEDICINE

## 2019-09-05 PROCEDURE — 25000125 ZZHC RX 250: Performed by: ANESTHESIOLOGY

## 2019-09-05 PROCEDURE — 25000566 ZZH SEVOFLURANE, EA 15 MIN: Performed by: ORTHOPAEDIC SURGERY

## 2019-09-05 PROCEDURE — 86900 BLOOD TYPING SEROLOGIC ABO: CPT | Performed by: ANESTHESIOLOGY

## 2019-09-05 PROCEDURE — 25000125 ZZHC RX 250: Performed by: PHYSICIAN ASSISTANT

## 2019-09-05 PROCEDURE — C1776 JOINT DEVICE (IMPLANTABLE): HCPCS | Performed by: ORTHOPAEDIC SURGERY

## 2019-09-05 PROCEDURE — 85018 HEMOGLOBIN: CPT | Performed by: PHYSICIAN ASSISTANT

## 2019-09-05 PROCEDURE — 86850 RBC ANTIBODY SCREEN: CPT | Performed by: ANESTHESIOLOGY

## 2019-09-05 PROCEDURE — 37000009 ZZH ANESTHESIA TECHNICAL FEE, EACH ADDTL 15 MIN: Performed by: ORTHOPAEDIC SURGERY

## 2019-09-05 PROCEDURE — 86901 BLOOD TYPING SEROLOGIC RH(D): CPT | Performed by: ANESTHESIOLOGY

## 2019-09-05 PROCEDURE — 84132 ASSAY OF SERUM POTASSIUM: CPT | Performed by: PHYSICIAN ASSISTANT

## 2019-09-05 PROCEDURE — 82565 ASSAY OF CREATININE: CPT | Performed by: PHYSICIAN ASSISTANT

## 2019-09-05 PROCEDURE — 25800030 ZZH RX IP 258 OP 636: Performed by: PHYSICIAN ASSISTANT

## 2019-09-05 PROCEDURE — 25800030 ZZH RX IP 258 OP 636: Performed by: ORTHOPAEDIC SURGERY

## 2019-09-05 PROCEDURE — 25800025 ZZH RX 258: Performed by: ORTHOPAEDIC SURGERY

## 2019-09-05 PROCEDURE — 25000125 ZZHC RX 250: Performed by: ORTHOPAEDIC SURGERY

## 2019-09-05 PROCEDURE — 25000125 ZZHC RX 250: Performed by: NURSE ANESTHETIST, CERTIFIED REGISTERED

## 2019-09-05 PROCEDURE — 25000132 ZZH RX MED GY IP 250 OP 250 PS 637: Performed by: PHYSICIAN ASSISTANT

## 2019-09-05 PROCEDURE — 25000128 H RX IP 250 OP 636: Performed by: ORTHOPAEDIC SURGERY

## 2019-09-05 DEVICE — IMP SCR BONE CAN ACE 6.5X25MM 1217-25-500: Type: IMPLANTABLE DEVICE | Site: HIP | Status: FUNCTIONAL

## 2019-09-05 DEVICE — IMPLANTABLE DEVICE: Type: IMPLANTABLE DEVICE | Site: HIP | Status: FUNCTIONAL

## 2019-09-05 DEVICE — IMP APEX HOLE ELIMINATOR HIP DEPUY DURALOC 1246-03-000: Type: IMPLANTABLE DEVICE | Site: HIP | Status: FUNCTIONAL

## 2019-09-05 DEVICE — IMP SCR BONE CAN ACE 6.5X35MM 1217-35-500: Type: IMPLANTABLE DEVICE | Site: HIP | Status: FUNCTIONAL

## 2019-09-05 DEVICE — IMP CUP ACE PINNACLE 56MM 1217-22-056: Type: IMPLANTABLE DEVICE | Site: HIP | Status: FUNCTIONAL

## 2019-09-05 DEVICE — IMP HEAD FEMORAL DEPUY CERAMIC 36MM +5MM 136536320: Type: IMPLANTABLE DEVICE | Site: HIP | Status: FUNCTIONAL

## 2019-09-05 RX ORDER — HYDROMORPHONE HYDROCHLORIDE 1 MG/ML
.3-.5 INJECTION, SOLUTION INTRAMUSCULAR; INTRAVENOUS; SUBCUTANEOUS
Status: DISCONTINUED | OUTPATIENT
Start: 2019-09-05 | End: 2019-09-06 | Stop reason: HOSPADM

## 2019-09-05 RX ORDER — SENNOSIDES A AND B 8.6 MG/1
1-2 TABLET, FILM COATED ORAL DAILY
Qty: 14 TABLET | Refills: 0 | Status: SHIPPED | OUTPATIENT
Start: 2019-09-05

## 2019-09-05 RX ORDER — PREGABALIN 150 MG/1
150 CAPSULE ORAL ONCE
Status: COMPLETED | OUTPATIENT
Start: 2019-09-05 | End: 2019-09-05

## 2019-09-05 RX ORDER — ACETAMINOPHEN 500 MG
1000 TABLET ORAL ONCE
Status: COMPLETED | OUTPATIENT
Start: 2019-09-05 | End: 2019-09-05

## 2019-09-05 RX ORDER — DEXTROSE MONOHYDRATE, SODIUM CHLORIDE, AND POTASSIUM CHLORIDE 50; 1.49; 4.5 G/1000ML; G/1000ML; G/1000ML
INJECTION, SOLUTION INTRAVENOUS CONTINUOUS
Status: DISCONTINUED | OUTPATIENT
Start: 2019-09-05 | End: 2019-09-06

## 2019-09-05 RX ORDER — CELECOXIB 200 MG/1
200 CAPSULE ORAL DAILY
Qty: 30 CAPSULE | Refills: 0 | Status: SHIPPED | OUTPATIENT
Start: 2019-09-05

## 2019-09-05 RX ORDER — CELECOXIB 200 MG/1
400 CAPSULE ORAL ONCE
Status: DISCONTINUED | OUTPATIENT
Start: 2019-09-05 | End: 2019-09-05

## 2019-09-05 RX ORDER — ONDANSETRON 4 MG/1
4 TABLET, ORALLY DISINTEGRATING ORAL EVERY 30 MIN PRN
Status: DISCONTINUED | OUTPATIENT
Start: 2019-09-05 | End: 2019-09-05 | Stop reason: HOSPADM

## 2019-09-05 RX ORDER — SIMVASTATIN 5 MG
5 TABLET ORAL DAILY
Status: DISCONTINUED | OUTPATIENT
Start: 2019-09-05 | End: 2019-09-05

## 2019-09-05 RX ORDER — NEOSTIGMINE METHYLSULFATE 1 MG/ML
VIAL (ML) INJECTION PRN
Status: DISCONTINUED | OUTPATIENT
Start: 2019-09-05 | End: 2019-09-05

## 2019-09-05 RX ORDER — SIMVASTATIN 5 MG
5 TABLET ORAL EVERY EVENING
Status: DISCONTINUED | OUTPATIENT
Start: 2019-09-05 | End: 2019-09-06 | Stop reason: HOSPADM

## 2019-09-05 RX ORDER — NALOXONE HYDROCHLORIDE 0.4 MG/ML
.1-.4 INJECTION, SOLUTION INTRAMUSCULAR; INTRAVENOUS; SUBCUTANEOUS
Status: DISCONTINUED | OUTPATIENT
Start: 2019-09-05 | End: 2019-09-06 | Stop reason: HOSPADM

## 2019-09-05 RX ORDER — ACETAMINOPHEN 325 MG/1
975 TABLET ORAL EVERY 8 HOURS
Status: DISCONTINUED | OUTPATIENT
Start: 2019-09-05 | End: 2019-09-06 | Stop reason: HOSPADM

## 2019-09-05 RX ORDER — OXYCODONE HYDROCHLORIDE 5 MG/1
5-10 TABLET ORAL EVERY 6 HOURS PRN
Qty: 30 TABLET | Refills: 0 | Status: SHIPPED | OUTPATIENT
Start: 2019-09-05 | End: 2019-09-08

## 2019-09-05 RX ORDER — ONDANSETRON 2 MG/ML
4 INJECTION INTRAMUSCULAR; INTRAVENOUS EVERY 6 HOURS PRN
Status: DISCONTINUED | OUTPATIENT
Start: 2019-09-05 | End: 2019-09-06 | Stop reason: HOSPADM

## 2019-09-05 RX ORDER — KETOROLAC TROMETHAMINE 30 MG/ML
INJECTION, SOLUTION INTRAMUSCULAR; INTRAVENOUS PRN
Status: DISCONTINUED | OUTPATIENT
Start: 2019-09-05 | End: 2019-09-05 | Stop reason: HOSPADM

## 2019-09-05 RX ORDER — LISINOPRIL 10 MG/1
10 TABLET ORAL DAILY
Status: DISCONTINUED | OUTPATIENT
Start: 2019-09-06 | End: 2019-09-06 | Stop reason: HOSPADM

## 2019-09-05 RX ORDER — FENTANYL CITRATE 50 UG/ML
25-50 INJECTION, SOLUTION INTRAMUSCULAR; INTRAVENOUS
Status: DISCONTINUED | OUTPATIENT
Start: 2019-09-05 | End: 2019-09-05 | Stop reason: HOSPADM

## 2019-09-05 RX ORDER — PROCHLORPERAZINE MALEATE 5 MG
5 TABLET ORAL EVERY 6 HOURS PRN
Status: DISCONTINUED | OUTPATIENT
Start: 2019-09-05 | End: 2019-09-06 | Stop reason: HOSPADM

## 2019-09-05 RX ORDER — SODIUM CHLORIDE, SODIUM LACTATE, POTASSIUM CHLORIDE, CALCIUM CHLORIDE 600; 310; 30; 20 MG/100ML; MG/100ML; MG/100ML; MG/100ML
INJECTION, SOLUTION INTRAVENOUS CONTINUOUS
Status: DISCONTINUED | OUTPATIENT
Start: 2019-09-05 | End: 2019-09-05 | Stop reason: HOSPADM

## 2019-09-05 RX ORDER — OXYCODONE HYDROCHLORIDE 5 MG/1
5-10 TABLET ORAL
Status: DISCONTINUED | OUTPATIENT
Start: 2019-09-05 | End: 2019-09-06 | Stop reason: HOSPADM

## 2019-09-05 RX ORDER — ONDANSETRON 4 MG/1
4 TABLET, ORALLY DISINTEGRATING ORAL EVERY 6 HOURS PRN
Status: DISCONTINUED | OUTPATIENT
Start: 2019-09-05 | End: 2019-09-06 | Stop reason: HOSPADM

## 2019-09-05 RX ORDER — ACETAMINOPHEN 325 MG/1
650 TABLET ORAL EVERY 4 HOURS PRN
Status: DISCONTINUED | OUTPATIENT
Start: 2019-09-08 | End: 2019-09-06 | Stop reason: HOSPADM

## 2019-09-05 RX ORDER — ONDANSETRON 2 MG/ML
INJECTION INTRAMUSCULAR; INTRAVENOUS PRN
Status: DISCONTINUED | OUTPATIENT
Start: 2019-09-05 | End: 2019-09-05

## 2019-09-05 RX ORDER — HYDROMORPHONE HYDROCHLORIDE 1 MG/ML
.3-.5 INJECTION, SOLUTION INTRAMUSCULAR; INTRAVENOUS; SUBCUTANEOUS EVERY 5 MIN PRN
Status: DISCONTINUED | OUTPATIENT
Start: 2019-09-05 | End: 2019-09-05 | Stop reason: HOSPADM

## 2019-09-05 RX ORDER — CEFAZOLIN SODIUM 1 G/3ML
1 INJECTION, POWDER, FOR SOLUTION INTRAMUSCULAR; INTRAVENOUS SEE ADMIN INSTRUCTIONS
Status: DISCONTINUED | OUTPATIENT
Start: 2019-09-05 | End: 2019-09-05 | Stop reason: HOSPADM

## 2019-09-05 RX ORDER — CEFAZOLIN SODIUM 2 G/100ML
2 INJECTION, SOLUTION INTRAVENOUS
Status: COMPLETED | OUTPATIENT
Start: 2019-09-05 | End: 2019-09-05

## 2019-09-05 RX ORDER — CEFAZOLIN SODIUM 2 G/100ML
2 INJECTION, SOLUTION INTRAVENOUS EVERY 8 HOURS
Status: COMPLETED | OUTPATIENT
Start: 2019-09-05 | End: 2019-09-06

## 2019-09-05 RX ORDER — ONDANSETRON 2 MG/ML
4 INJECTION INTRAMUSCULAR; INTRAVENOUS EVERY 30 MIN PRN
Status: DISCONTINUED | OUTPATIENT
Start: 2019-09-05 | End: 2019-09-05 | Stop reason: HOSPADM

## 2019-09-05 RX ORDER — TEMAZEPAM 7.5 MG/1
7.5 CAPSULE ORAL
Status: DISCONTINUED | OUTPATIENT
Start: 2019-09-06 | End: 2019-09-06 | Stop reason: HOSPADM

## 2019-09-05 RX ORDER — AMOXICILLIN 250 MG
1 CAPSULE ORAL 2 TIMES DAILY
Status: DISCONTINUED | OUTPATIENT
Start: 2019-09-05 | End: 2019-09-06 | Stop reason: HOSPADM

## 2019-09-05 RX ORDER — VANCOMYCIN HYDROCHLORIDE 1 G/20ML
INJECTION, POWDER, LYOPHILIZED, FOR SOLUTION INTRAVENOUS PRN
Status: DISCONTINUED | OUTPATIENT
Start: 2019-09-05 | End: 2019-09-05 | Stop reason: HOSPADM

## 2019-09-05 RX ORDER — ACETAMINOPHEN 500 MG
500-1000 TABLET ORAL EVERY 6 HOURS PRN
Status: ON HOLD | COMMUNITY
End: 2019-09-05

## 2019-09-05 RX ORDER — FENTANYL CITRATE 50 UG/ML
INJECTION, SOLUTION INTRAMUSCULAR; INTRAVENOUS PRN
Status: DISCONTINUED | OUTPATIENT
Start: 2019-09-05 | End: 2019-09-05

## 2019-09-05 RX ORDER — ASPIRIN 325 MG
325 TABLET ORAL DAILY
Qty: 42 TABLET | Refills: 0 | Status: SHIPPED | OUTPATIENT
Start: 2019-09-05 | End: 2019-10-17

## 2019-09-05 RX ORDER — GLYCOPYRROLATE 0.2 MG/ML
INJECTION, SOLUTION INTRAMUSCULAR; INTRAVENOUS PRN
Status: DISCONTINUED | OUTPATIENT
Start: 2019-09-05 | End: 2019-09-05

## 2019-09-05 RX ORDER — HYDROXYZINE HYDROCHLORIDE 10 MG/1
10 TABLET, FILM COATED ORAL EVERY 6 HOURS PRN
Status: DISCONTINUED | OUTPATIENT
Start: 2019-09-05 | End: 2019-09-06 | Stop reason: HOSPADM

## 2019-09-05 RX ORDER — PROPOFOL 10 MG/ML
INJECTION, EMULSION INTRAVENOUS PRN
Status: DISCONTINUED | OUTPATIENT
Start: 2019-09-05 | End: 2019-09-05

## 2019-09-05 RX ORDER — LIDOCAINE HYDROCHLORIDE 20 MG/ML
INJECTION, SOLUTION INFILTRATION; PERINEURAL PRN
Status: DISCONTINUED | OUTPATIENT
Start: 2019-09-05 | End: 2019-09-05

## 2019-09-05 RX ORDER — NALOXONE HYDROCHLORIDE 0.4 MG/ML
.1-.4 INJECTION, SOLUTION INTRAMUSCULAR; INTRAVENOUS; SUBCUTANEOUS
Status: DISCONTINUED | OUTPATIENT
Start: 2019-09-05 | End: 2019-09-05

## 2019-09-05 RX ORDER — KETOROLAC TROMETHAMINE 30 MG/ML
INJECTION, SOLUTION INTRAMUSCULAR; INTRAVENOUS PRN
Status: DISCONTINUED | OUTPATIENT
Start: 2019-09-05 | End: 2019-09-05

## 2019-09-05 RX ORDER — AMOXICILLIN 250 MG
2 CAPSULE ORAL 2 TIMES DAILY
Status: DISCONTINUED | OUTPATIENT
Start: 2019-09-05 | End: 2019-09-06 | Stop reason: HOSPADM

## 2019-09-05 RX ORDER — VECURONIUM BROMIDE 1 MG/ML
INJECTION, POWDER, LYOPHILIZED, FOR SOLUTION INTRAVENOUS PRN
Status: DISCONTINUED | OUTPATIENT
Start: 2019-09-05 | End: 2019-09-05

## 2019-09-05 RX ORDER — DEXAMETHASONE SODIUM PHOSPHATE 4 MG/ML
INJECTION, SOLUTION INTRA-ARTICULAR; INTRALESIONAL; INTRAMUSCULAR; INTRAVENOUS; SOFT TISSUE PRN
Status: DISCONTINUED | OUTPATIENT
Start: 2019-09-05 | End: 2019-09-05

## 2019-09-05 RX ORDER — KETOROLAC TROMETHAMINE 15 MG/ML
15 INJECTION, SOLUTION INTRAMUSCULAR; INTRAVENOUS EVERY 6 HOURS
Status: DISCONTINUED | OUTPATIENT
Start: 2019-09-05 | End: 2019-09-06 | Stop reason: HOSPADM

## 2019-09-05 RX ORDER — LIDOCAINE 40 MG/G
CREAM TOPICAL
Status: DISCONTINUED | OUTPATIENT
Start: 2019-09-05 | End: 2019-09-06 | Stop reason: HOSPADM

## 2019-09-05 RX ADMIN — CEFAZOLIN SODIUM 2 G: 2 INJECTION, SOLUTION INTRAVENOUS at 11:45

## 2019-09-05 RX ADMIN — DEXAMETHASONE SODIUM PHOSPHATE 4 MG: 4 INJECTION, SOLUTION INTRA-ARTICULAR; INTRALESIONAL; INTRAMUSCULAR; INTRAVENOUS; SOFT TISSUE at 12:57

## 2019-09-05 RX ADMIN — KETOROLAC TROMETHAMINE 15 MG: 15 INJECTION, SOLUTION INTRAMUSCULAR; INTRAVENOUS at 19:17

## 2019-09-05 RX ADMIN — POTASSIUM CHLORIDE, DEXTROSE MONOHYDRATE AND SODIUM CHLORIDE: 150; 5; 450 INJECTION, SOLUTION INTRAVENOUS at 17:51

## 2019-09-05 RX ADMIN — HYDROMORPHONE HYDROCHLORIDE 0.25 MG: 1 INJECTION, SOLUTION INTRAMUSCULAR; INTRAVENOUS; SUBCUTANEOUS at 14:18

## 2019-09-05 RX ADMIN — HYDROMORPHONE HYDROCHLORIDE 0.25 MG: 1 INJECTION, SOLUTION INTRAMUSCULAR; INTRAVENOUS; SUBCUTANEOUS at 14:02

## 2019-09-05 RX ADMIN — CEFAZOLIN SODIUM 1 G: 2 INJECTION, SOLUTION INTRAVENOUS at 13:42

## 2019-09-05 RX ADMIN — VECURONIUM BROMIDE 2 MG: 1 INJECTION, POWDER, LYOPHILIZED, FOR SOLUTION INTRAVENOUS at 13:19

## 2019-09-05 RX ADMIN — SODIUM CHLORIDE 1 G: 9 INJECTION, SOLUTION INTRAVENOUS at 13:48

## 2019-09-05 RX ADMIN — GLYCOPYRROLATE 0.8 MG: 0.2 INJECTION, SOLUTION INTRAMUSCULAR; INTRAVENOUS at 13:55

## 2019-09-05 RX ADMIN — ONDANSETRON 4 MG: 2 INJECTION INTRAMUSCULAR; INTRAVENOUS at 13:45

## 2019-09-05 RX ADMIN — VECURONIUM BROMIDE 1 MG: 1 INJECTION, POWDER, LYOPHILIZED, FOR SOLUTION INTRAVENOUS at 12:05

## 2019-09-05 RX ADMIN — PHENYLEPHRINE HYDROCHLORIDE 100 MCG: 10 INJECTION INTRAVENOUS at 12:04

## 2019-09-05 RX ADMIN — ACETAMINOPHEN 975 MG: 325 TABLET ORAL at 17:49

## 2019-09-05 RX ADMIN — LIDOCAINE HYDROCHLORIDE 0.2 ML: 10 INJECTION, SOLUTION EPIDURAL; INFILTRATION; INTRACAUDAL; PERINEURAL at 10:57

## 2019-09-05 RX ADMIN — PROPOFOL 170 MG: 10 INJECTION, EMULSION INTRAVENOUS at 11:19

## 2019-09-05 RX ADMIN — NEOSTIGMINE METHYLSULFATE 5 MG: 1 INJECTION, SOLUTION INTRAVENOUS at 13:55

## 2019-09-05 RX ADMIN — VECURONIUM BROMIDE 2 MG: 1 INJECTION, POWDER, LYOPHILIZED, FOR SOLUTION INTRAVENOUS at 12:52

## 2019-09-05 RX ADMIN — LIDOCAINE HYDROCHLORIDE 80 MG: 20 INJECTION, SOLUTION INFILTRATION; PERINEURAL at 11:19

## 2019-09-05 RX ADMIN — PREGABALIN 150 MG: 150 CAPSULE ORAL at 10:18

## 2019-09-05 RX ADMIN — SODIUM CHLORIDE, POTASSIUM CHLORIDE, SODIUM LACTATE AND CALCIUM CHLORIDE: 600; 310; 30; 20 INJECTION, SOLUTION INTRAVENOUS at 10:56

## 2019-09-05 RX ADMIN — PHENYLEPHRINE HYDROCHLORIDE 100 MCG: 10 INJECTION INTRAVENOUS at 13:53

## 2019-09-05 RX ADMIN — PHENYLEPHRINE HYDROCHLORIDE 100 MCG: 10 INJECTION INTRAVENOUS at 11:57

## 2019-09-05 RX ADMIN — VECURONIUM BROMIDE 2 MG: 1 INJECTION, POWDER, LYOPHILIZED, FOR SOLUTION INTRAVENOUS at 12:28

## 2019-09-05 RX ADMIN — ACETAMINOPHEN 1000 MG: 500 TABLET, FILM COATED ORAL at 10:18

## 2019-09-05 RX ADMIN — PHENYLEPHRINE HYDROCHLORIDE 100 MCG: 10 INJECTION INTRAVENOUS at 11:44

## 2019-09-05 RX ADMIN — MIDAZOLAM 1 MG: 1 INJECTION INTRAMUSCULAR; INTRAVENOUS at 11:15

## 2019-09-05 RX ADMIN — ROCURONIUM BROMIDE 50 MG: 10 INJECTION INTRAVENOUS at 11:19

## 2019-09-05 RX ADMIN — KETOROLAC TROMETHAMINE 15 MG: 30 INJECTION, SOLUTION INTRAMUSCULAR at 13:51

## 2019-09-05 RX ADMIN — SENNOSIDES AND DOCUSATE SODIUM 2 TABLET: 8.6; 5 TABLET ORAL at 20:50

## 2019-09-05 RX ADMIN — FENTANYL CITRATE 100 MCG: 50 INJECTION, SOLUTION INTRAMUSCULAR; INTRAVENOUS at 11:19

## 2019-09-05 RX ADMIN — SODIUM CHLORIDE 1 G: 9 INJECTION, SOLUTION INTRAVENOUS at 12:00

## 2019-09-05 RX ADMIN — SODIUM CHLORIDE, POTASSIUM CHLORIDE, SODIUM LACTATE AND CALCIUM CHLORIDE: 600; 310; 30; 20 INJECTION, SOLUTION INTRAVENOUS at 13:20

## 2019-09-05 RX ADMIN — SIMVASTATIN 5 MG: 5 TABLET, FILM COATED ORAL at 22:24

## 2019-09-05 RX ADMIN — CEFAZOLIN SODIUM 2 G: 2 INJECTION, SOLUTION INTRAVENOUS at 20:50

## 2019-09-05 RX ADMIN — HYDROMORPHONE HYDROCHLORIDE 0.5 MG: 1 INJECTION, SOLUTION INTRAMUSCULAR; INTRAVENOUS; SUBCUTANEOUS at 13:58

## 2019-09-05 ASSESSMENT — ENCOUNTER SYMPTOMS: SEIZURES: 0

## 2019-09-05 ASSESSMENT — MIFFLIN-ST. JEOR: SCORE: 1658.81

## 2019-09-05 ASSESSMENT — LIFESTYLE VARIABLES: TOBACCO_USE: 1

## 2019-09-05 ASSESSMENT — ACTIVITIES OF DAILY LIVING (ADL)
ADLS_ACUITY_SCORE: 13
ADLS_ACUITY_SCORE: 13

## 2019-09-05 NOTE — ANESTHESIA CARE TRANSFER NOTE
Patient: Lalit Villarreal    Procedure(s):  RIGHT DIRECT ANTERIOR APPROACH TOTAL HIP ARTHROPLASTY    Diagnosis: DJD RIGHT HIP  Diagnosis Additional Information: No value filed.    Anesthesia Type:   General, ETT     Note:  Airway :Face Mask  Patient transferred to:PACU  Comments: To PACU with face mask, 8l/min O2, spontaneous respirations. VSS. Report to RNHandoff Report: Identifed the Patient, Identified the Reponsible Provider, Reviewed the pertinent medical history, Discussed the surgical course, Reviewed Intra-OP anesthesia mangement and issues during anesthesia, Set expectations for post-procedure period and Allowed opportunity for questions and acknowledgement of understanding      Vitals: (Last set prior to Anesthesia Care Transfer)    CRNA VITALS  9/5/2019 1348 - 9/5/2019 1431      9/5/2019             Pulse:  79    SpO2:  98 %    Resp Rate (observed):  12    Resp Rate (set):  10                Electronically Signed By: ERWIN Anguiano CRNA  September 5, 2019  2:31 PM

## 2019-09-05 NOTE — PROGRESS NOTES
Admission medication history interview status for the 9/5/2019  admission is complete. See EPIC admission navigator for prior to admission medications     Medication history source reliability:Good    Medication history interview source(s):Patient    Medication history resources (including written lists, pill bottles, clinic record):None    Primary pharmacy[facility]    Additional medication history information not noted on PTA med list :None    Time spent in this activity: 30 minutes    Prior to Admission medications    Medication Sig Last Dose Taking? Auth Provider   acetaminophen (TYLENOL) 500 MG tablet Take 500-1,000 mg by mouth every 6 hours as needed for mild pain 9/4/2019 at pm Yes Reported, Patient   Ascorbic Acid (VITAMIN C PO) Take 1 tablet by mouth daily 8/22/2019 Yes Reported, Patient   CALCIUM PO Take 1 tablet by mouth daily 8/22/2019 Yes Reported, Patient   Coenzyme Q10 (COQ-10 PO) Take 1 tablet by mouth daily 8/22/2019 Yes Reported, Patient   Flaxseed, Linseed, (FLAX SEED OIL) 1000 MG capsule Take 1 capsule by mouth daily 8/26/2019 Yes Reported, Patient   GLUCOSAMINE-CHONDROITIN PO Take 2 capsules by mouth daily 8/22/2019 Yes Reported, Patient   lisinopril (PRINIVIL/ZESTRIL) 10 MG tablet TAKE 1 TABLET (10 MG) BY MOUTH DAILY 9/5/2019 at 0630 Yes Roya Johnson MD   lovastatin (MEVACOR) 10 MG tablet TAKE 1 TABLET (10 MG) BY MOUTH AT BEDTIME 9/4/2019 at pm Yes Roya Johnson MD   Omega-3 Fatty Acids (FISH OIL PO) Take 2 capsules by mouth daily 8/26/2019 Yes Reported, Patient   omeprazole (PRILOSEC) 20 MG CR capsule TAKE ONE CAPSULE BY MOUTH ONE TIME DAILY  Patient taking differently: 0630 9/5/2019 at 0630 Yes Roya Johnson MD   vitamin B complex with vitamin C (VITAMIN  B COMPLEX) tablet Take 1 tablet by mouth daily 8/22/2019 Yes Reported, Patient

## 2019-09-05 NOTE — ANESTHESIA POSTPROCEDURE EVALUATION
Patient: Lalit Villarreal    Procedure(s):  RIGHT DIRECT ANTERIOR APPROACH TOTAL HIP ARTHROPLASTY    Diagnosis:DJD RIGHT HIP  Diagnosis Additional Information: No value filed.    Anesthesia Type:  General, ETT    Note:  Anesthesia Post Evaluation    Patient location during evaluation: PACU  Patient participation: Able to fully participate in evaluation  Level of consciousness: awake, awake and alert and responsive to verbal stimuli  Pain management: adequate  Airway patency: patent  Cardiovascular status: acceptable  Respiratory status: acceptable  Hydration status: acceptable  PONV: none     Anesthetic complications: None          Last vitals:  Vitals:    09/05/19 1520 09/05/19 1530 09/05/19 1532   BP: 128/67     Pulse: 62     Resp: 17 13    Temp: 36.8  C (98.2  F) 36.7  C (98.1  F)    SpO2: 99% 97% 98%         Electronically Signed By: Reba Watson  September 5, 2019  4:05 PM

## 2019-09-05 NOTE — ANESTHESIA PREPROCEDURE EVALUATION
Anesthesia Pre-Procedure Evaluation    Patient: Lalit Villarreal   MRN: [id number] : 1935          Preoperative Diagnosis: DJD RIGHT HIP    Procedure(s):  RIGHT HIP ARTHROPLASTY ANTERIOR APPROACH  (DEPUY)    Past Medical History:   Diagnosis Date     Benign neoplasm of other specified sites of skin     DERM     Essential hypertension, benign      Family history of colon cancer      GERD (gastroesophageal reflux disease)      Mixed hyperlipidemia      Osteoarthrosis, unspecified whether generalized or localized, unspecified site      Seborrheic dermatitis, unspecified     follows up with DERM     Sleep apnea     uses CPAP     Past Surgical History:   Procedure Laterality Date     C NONSPECIFIC PROCEDURE      Cholecystectomy     C NONSPECIFIC PROCEDURE      Vein stripping     COLONOSCOPY N/A 2019    Procedure: Incomplete COLONOSCOPY  due to poor prep.;  Surgeon: Nathanael Hernandez MD;  Location:  GI     HC COLONOSCOPY THRU STOMA, DIAGNOSTIC      due in      RELEASE CARPAL TUNNEL Right 2016    Procedure: RELEASE CARPAL TUNNEL;  Surgeon: Kaiden Morris MD;  Location:  OR       Anesthesia Evaluation     .             ROS/MED HX    ENT/Pulmonary:     (+)sleep apnea, tobacco use, Past use , . .    Neurologic:      (-) seizures and CVA   Cardiovascular:     (+) Dyslipidemia, hypertension----. : . . HENDRICKS, . pacemaker Reason placed: bradycardia:. .       METS/Exercise Tolerance:     Hematologic:         Musculoskeletal:   (+) arthritis,  -       GI/Hepatic:     (+) GERD Asymptomatic on medication,       Renal/Genitourinary:         Endo:     (+) type II DM (pre-DM) Obesity, .      Psychiatric:         Infectious Disease:         Malignancy:         Other:                          Physical Exam      Airway   Mallampati: III  TM distance: >3 FB  Neck ROM: full    Dental   (+) caps    Cardiovascular   Rhythm and rate: regular      Pulmonary    breath sounds clear to  "auscultation            Lab Results   Component Value Date    WBC 10.2 12/15/2015    HGB 13.0 (L) 12/15/2015    HCT 38.6 (L) 12/15/2015     12/15/2015     07/05/2017    POTASSIUM 4.5 07/05/2017    CHLORIDE 108 07/05/2017    CO2 25 07/05/2017    BUN 20 07/05/2017    CR 0.98 07/05/2017     (H) 07/05/2017    SUKHI 8.6 07/05/2017    MAG 2.3 12/16/2015    ALBUMIN 3.6 07/05/2017    PROTTOTAL 6.5 (L) 07/05/2017    ALT 34 07/05/2017    AST 22 07/05/2017    ALKPHOS 56 07/05/2017    BILITOTAL 1.0 07/05/2017    PTT 29 12/14/2015    INR 1.03 12/14/2015       Preop Vitals  BP Readings from Last 3 Encounters:   05/29/19 119/59   07/24/17 120/62   07/05/17 128/60    Pulse Readings from Last 3 Encounters:   05/29/19 63   07/24/17 91   07/05/17 82      Resp Readings from Last 3 Encounters:   05/29/19 12   11/08/16 20   01/05/16 16    SpO2 Readings from Last 3 Encounters:   05/29/19 98%   07/24/17 94%   07/05/17 94%      Temp Readings from Last 1 Encounters:   07/24/17 37.3  C (99.1  F) (Oral)    Ht Readings from Last 1 Encounters:   07/24/17 1.727 m (5' 8\")      Wt Readings from Last 1 Encounters:   07/24/17 103.2 kg (227 lb 9.6 oz)    Estimated body mass index is 34.61 kg/m  as calculated from the following:    Height as of 7/24/17: 1.727 m (5' 8\").    Weight as of 7/24/17: 103.2 kg (227 lb 9.6 oz).       Anesthesia Plan      History & Physical Review  History and physical reviewed and following examination; no interval change.    ASA Status:  2 .    NPO Status:  > 8 hours    Plan for General and ETT   PONV prophylaxis:  Ondansetron (or other 5HT-3)  Additional equipment: Videolaryngoscope      Postoperative Care      Consents                 Reba Watson  "

## 2019-09-05 NOTE — CONSULTS
Consult Date:  09/05/2019      REQUESTING PHYSICIAN:  Mark Gonzalez MD      REASON FOR CONSULTATION:  Hospitalist consult.      HISTORY OF PRESENT ILLNESS:  Mr. 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 Mark Gonzalez underwent a right direct anterior approach total hip arthroplasty for a longstanding hip pain.  Duration of surgery was 3 hours.  He received perioperative Decadron, Versed, phenylephrine, tranexamic acid, Ancef, Dilaudid, 1400 mL of LR, urine output was 200 mL and EBL was 325 mL.  At the end of the surgery, the patient was admitted to station 55 for further evaluation and management.  Hospitalist Service was asked to see him in consultation to assist with medical management of comorbid conditions.      The patient's blood pressure was 132/68 at his preop outpatient eval.  He does not check blood pressures at home.  He last took his dose of lisinopril this morning.  He reports that he is very compliant for the last 17 years with his KANCHAN/cpap treatment.  He does not follow any particular diet for his prediabetes.  He does not check blood sugars.  He follows with [facility]iology at [facility] and is awaiting immunology evaluation.  He denies any other acute changes since his preoperative visit.  He is quite jovial and making jokes throughout the interview.  His daughter and wife are with him.  As above, Hospitalist Service was asked to see him to assist with medical management of his comorbid conditions and postoperative state.      PAST MEDICAL HISTORY:   1.  Sinus node dysfunction, status post permanent pacemaker.   2.  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  Workup is ongoing, but EF is preserved at 55% to 60% with severe LAE by his last echocardiogram.   3.   Prediabetes.   4.  Hypertension.   5.  Hyperlipidemia.   6.  GERD.     7.  KANCHAN.   8.  Bilateral shoulder impingement.      PAST SURGICAL HISTORY:    Past Surgical History:   Procedure Laterality Date     C NONSPECIFIC PROCEDURE      Cholecystectomy     C NONSPECIFIC PROCEDURE      Vein stripping     COLONOSCOPY N/A 5/29/2019    Procedure: Incomplete COLONOSCOPY  due to poor prep.;  Surgeon: Nathanael Hernandez MD;  Location:  GI     HC COLONOSCOPY THRU STOMA, DIAGNOSTIC  01/05    due in 2010     RELEASE CARPAL TUNNEL Right 1/5/2016    Procedure: RELEASE CARPAL TUNNEL;  Surgeon: Kaiden Morris MD;  Location:  OR        ALLERGIES:       Allergies   Allergen Reactions     Celebrex [Celecoxib] GI Disturbance          SOCIAL HISTORY:  He receives primary care through the [facility] system.  He is a former 30-pack-year smoker.  Resides in [place] with his wife.  He is a nondrinker, nondrug user.      FAMILY HISTORY:  Three children are alive and well.      OUTPATIENT MEDICATIONS:  Medications Prior to Admission   Medication Sig Dispense Refill Last Dose     Ascorbic Acid (VITAMIN C PO) Take 1 tablet by mouth daily   8/22/2019     CALCIUM PO Take 1 tablet by mouth daily   8/22/2019     Coenzyme Q10 (COQ-10 PO) Take 1 tablet by mouth daily   8/22/2019     Flaxseed, Linseed, (FLAX SEED OIL) 1000 MG capsule Take 1 capsule by mouth daily   8/26/2019     GLUCOSAMINE-CHONDROITIN PO Take 2 capsules by mouth daily   8/22/2019     lisinopril (PRINIVIL/ZESTRIL) 10 MG tablet TAKE 1 TABLET (10 MG) BY MOUTH DAILY 30 tablet 0 9/5/2019 at 0630     lovastatin (MEVACOR) 10 MG tablet TAKE 1 TABLET (10 MG) BY MOUTH AT BEDTIME 90 tablet 0 9/4/2019 at pm     Omega-3 Fatty Acids (FISH OIL PO) Take 2 capsules by mouth daily   8/26/2019     omeprazole (PRILOSEC) 20 MG CR capsule TAKE ONE CAPSULE BY MOUTH ONE TIME DAILY (Patient taking differently: 0630) 90 capsule 0 9/5/2019 at 0630     vitamin B complex with vitamin C  (VITAMIN  B COMPLEX) tablet Take 1 tablet by mouth daily   8/22/2019     [DISCONTINUED] acetaminophen (TYLENOL) 500 MG tablet Take 500-1,000 mg by mouth every 6 hours as needed for mild pain   9/4/2019 at pm        REVIEW OF SYSTEMS:  Ten-point review of systems negative aside from what is described in the HPI.      PHYSICAL EXAMINATION:   GENERAL:  Elderly man lying in bed, making jokes, without acute distress.    Neuro: +follows commands wiggle toes and show 2 fingers bilat, face symmetric, tongue midline, speech fluent  HEENT: NC/AT, pupils equal round 3mm briskly reactive bilat, sclera nonicteric, noninjected, conjunctiva pink, mouth moist oral mucosa  Neck: supple  Heart: S1S2, no murmurs  Lungs: CTAB upper and lower lobes  Abd:normoactive bowel sounds, soft, nontender, nondisteded  Ext: no edema.  Right hip dressing clean, dry and intact.  No shadowing.  Distal operative extremity with preserved movement sensation and easily palpable dorsalis pedis pulse.   SKIN:  Warm.  Capillary refill is brisk.      ASSESSMENT AND PLAN:  Mr. 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 Mark Gonzalez underwent a right direct anterior approach total hip arthroplasty.  Hospitalist Service was asked to see him in consultation to assist with management of his medical comorbid conditions and postoperative state.      PROBLEM LIST AND PLAN:   Hypertension.   Hyperlipidemia.   -- We will resume his prior to admission lisinopril in the a.m of 9/6/19. since he already took a dose on 09/05/2019.   -- Resume PTA lovastatin.     KANCHAN.   Pulmonary prophylaxis.   -- We will order CPAP for his home device tonight, which he has   -- incentive spirometry  education provided.     Right total hip arthroplasty, direct anterior approach on 2019 with Dr. Mark Gonzalez.   -- Defer analgesia, mobility, therapies, antimicrobials, pharmacologic DVT prophylaxis to the primary surgical service.     Fluid, electrolyte, renal.  I note that the patient is on IV fluids containing potassium.  His serum potassium on day of surgery was 4.4.  We anticipate resuming his lisinopril on the a.m. of 2019.   -- We will check a BMP in the morning to ensure his potassium is acceptable to start the ACE inhibitor.     Prophylaxis.   -- PCDs.      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      We thank the Orthopedic Service for this interesting consult.  Hospitalist Service will round on patient again on 2019.         TOTAL TIME:  26 minutes, of which 16 minutes was face-to-face time, remainder spent in counseling and coordination of care.        The patient will be independently evaluated by Dr. Precious Londono.         PRECIOUS [place]ON[place] DO       As dictated by YANIRA BARTON, APRN, CNP            D: 2019   T: 2019   MT: ABBE      Name:     JAYCEE DAVIDSON   MRN:      [id number]        Account:       [id number]   :      1935           Consult Date:  2019      Document: [id number]       cc: Kati Kennedy MD

## 2019-09-05 NOTE — PLAN OF CARE
Alert and oriented x 4. Dressing cdi, cms intact.  Fontanez.  Tolerated regular diet. LS clean. Ambulated to the elevators and back to room. Up with assist of 1 and walker.  C pap at night. Plan to discharge home tomorrow

## 2019-09-05 NOTE — BRIEF OP NOTE
[facility]    Brief Operative Note    Pre-operative diagnosis: Right hip OA  Post-operative diagnosis same  Procedure: RIGHT DIRECT ANTERIOR TOTAL HIP ARTHROPLASTY  Surgeon(s) and Role:     * Mark Martino MD - Primary     * Preston Garcia PA-C - Assisting     * Shi Jeffrey PA-C - Assisting    Anesthesia: General   Estimated blood loss: 325 ml  Drains:  None  Specimens: None  Findings:  Advanced OA  Complications: None    Plan: DC home POD1 w/family assist.  DVT prophylaxis w/ASA 325mg QD x6wks.        Implants:    Implant Name Type Inv. Item Serial No.  Lot No. LRB No. Used   IMP APEX HOLE ELIMINATOR HIP DEPUY DURALOC 1246- Metallic Hardware/[employer] IMP APEX HOLE ELIMINATOR HIP DEPUY DURALOC 1246-  [employer] [facility] CARE [facility]-C [id number] Right 1   IMP CUP ACE PINNACLE 56MM 1217- Total Joint Component/Insert IMP CUP ACE PINNACLE 56MM 1217-  [employer] [id number] Right 1   PINNACLE CANCELLOUS BONE SCREW [id number]     [id number] Right 1   IMP SCR BONE CAN ACE 6.5X25MM 1217- Metallic Hardware/[employer] IMP SCR BONE CAN ACE 6.5X25MM 1217-  [employer] [facility] CARE [facility]-C [id number] Right 1   IMP SCR BONE CAN ACE 6.5X35MM 1217- Metallic Hardware/[employer] IMP SCR BONE CAN ACE 6.5X35MM 1217-  [employer] [id number] Right 1   FEMORAL STEM 12/14 TAPER ACTIS DUOFLEX HIP PROSTHESIS CEMENTLESS SIZE 5 HIGH COLLAR     Depuy [id number] Right 1   IMP HEAD FEMORAL DEPUY CERAMIC 36MM +5MM [id number] Total Joint Component/Insert IMP HEAD FEMORAL DEPUY CERAMIC 36MM +5MM [id number]  [employer] [facility] CARE [facility]   [id number] Right 1

## 2019-09-06 ENCOUNTER — APPOINTMENT (OUTPATIENT)
Dept: OCCUPATIONAL THERAPY | Facility: CLINIC | Age: 84
DRG: 470 | End: 2019-09-06
Attending: ORTHOPAEDIC SURGERY
Payer: COMMERCIAL

## 2019-09-06 ENCOUNTER — APPOINTMENT (OUTPATIENT)
Dept: PHYSICAL THERAPY | Facility: CLINIC | Age: 84
DRG: 470 | End: 2019-09-06
Attending: ORTHOPAEDIC SURGERY
Payer: COMMERCIAL

## 2019-09-06 VITALS
HEIGHT: 68 IN | OXYGEN SATURATION: 93 % | WEIGHT: 217 LBS | HEART RATE: 64 BPM | DIASTOLIC BLOOD PRESSURE: 59 MMHG | BODY MASS INDEX: 32.89 KG/M2 | RESPIRATION RATE: 16 BRPM | TEMPERATURE: 98 F | SYSTOLIC BLOOD PRESSURE: 105 MMHG

## 2019-09-06 LAB
ANION GAP SERPL CALCULATED.3IONS-SCNC: 6 MMOL/L (ref 3–14)
BUN SERPL-MCNC: 18 MG/DL (ref 7–30)
CALCIUM SERPL-MCNC: 8.5 MG/DL (ref 8.5–10.1)
CHLORIDE SERPL-SCNC: 103 MMOL/L (ref 94–109)
CO2 SERPL-SCNC: 27 MMOL/L (ref 20–32)
CREAT SERPL-MCNC: 0.98 MG/DL (ref 0.66–1.25)
GFR SERPL CREATININE-BSD FRML MDRD: 71 ML/MIN/{1.73_M2}
GLUCOSE SERPL-MCNC: 140 MG/DL (ref 70–99)
HGB BLD-MCNC: 11.8 G/DL (ref 13.3–17.7)
POTASSIUM SERPL-SCNC: 4.8 MMOL/L (ref 3.4–5.3)
SODIUM SERPL-SCNC: 136 MMOL/L (ref 133–144)

## 2019-09-06 PROCEDURE — 80048 BASIC METABOLIC PNL TOTAL CA: CPT | Performed by: ORTHOPAEDIC SURGERY

## 2019-09-06 PROCEDURE — 97530 THERAPEUTIC ACTIVITIES: CPT | Mod: GO

## 2019-09-06 PROCEDURE — 99232 SBSQ HOSP IP/OBS MODERATE 35: CPT | Performed by: PHYSICIAN ASSISTANT

## 2019-09-06 PROCEDURE — 36415 COLL VENOUS BLD VENIPUNCTURE: CPT | Performed by: ORTHOPAEDIC SURGERY

## 2019-09-06 PROCEDURE — 25000128 H RX IP 250 OP 636: Performed by: ORTHOPAEDIC SURGERY

## 2019-09-06 PROCEDURE — 85018 HEMOGLOBIN: CPT | Performed by: ORTHOPAEDIC SURGERY

## 2019-09-06 PROCEDURE — 97110 THERAPEUTIC EXERCISES: CPT | Mod: GP | Performed by: PHYSICAL THERAPIST

## 2019-09-06 PROCEDURE — 97116 GAIT TRAINING THERAPY: CPT | Mod: GP | Performed by: PHYSICAL THERAPIST

## 2019-09-06 PROCEDURE — 99207 ZZC CDG-MDM COMPONENT: MEETS MODERATE - UP CODED: CPT | Performed by: PHYSICIAN ASSISTANT

## 2019-09-06 PROCEDURE — 97161 PT EVAL LOW COMPLEX 20 MIN: CPT | Mod: GP | Performed by: PHYSICAL THERAPIST

## 2019-09-06 PROCEDURE — 97165 OT EVAL LOW COMPLEX 30 MIN: CPT | Mod: GO

## 2019-09-06 PROCEDURE — 25000132 ZZH RX MED GY IP 250 OP 250 PS 637: Performed by: ORTHOPAEDIC SURGERY

## 2019-09-06 PROCEDURE — 97530 THERAPEUTIC ACTIVITIES: CPT | Mod: GP | Performed by: PHYSICAL THERAPIST

## 2019-09-06 PROCEDURE — 97535 SELF CARE MNGMENT TRAINING: CPT | Mod: GO

## 2019-09-06 PROCEDURE — 25000132 ZZH RX MED GY IP 250 OP 250 PS 637: Performed by: NURSE PRACTITIONER

## 2019-09-06 RX ADMIN — OMEPRAZOLE 20 MG: 20 CAPSULE, DELAYED RELEASE ORAL at 05:56

## 2019-09-06 RX ADMIN — CEFAZOLIN SODIUM 2 G: 2 INJECTION, SOLUTION INTRAVENOUS at 05:56

## 2019-09-06 RX ADMIN — LISINOPRIL 10 MG: 10 TABLET ORAL at 09:52

## 2019-09-06 RX ADMIN — KETOROLAC TROMETHAMINE 15 MG: 15 INJECTION, SOLUTION INTRAMUSCULAR; INTRAVENOUS at 07:23

## 2019-09-06 RX ADMIN — KETOROLAC TROMETHAMINE 15 MG: 15 INJECTION, SOLUTION INTRAMUSCULAR; INTRAVENOUS at 02:00

## 2019-09-06 RX ADMIN — ACETAMINOPHEN 975 MG: 325 TABLET ORAL at 02:00

## 2019-09-06 RX ADMIN — ACETAMINOPHEN 975 MG: 325 TABLET ORAL at 09:53

## 2019-09-06 RX ADMIN — ASPIRIN 325 MG: 325 TABLET, DELAYED RELEASE ORAL at 09:52

## 2019-09-06 ASSESSMENT — ACTIVITIES OF DAILY LIVING (ADL)
WHICH_OF_THE_ABOVE_FUNCTIONAL_RISKS_HAD_A_RECENT_ONSET_OR_CHANGE?: AMBULATION
ADLS_ACUITY_SCORE: 13
ADLS_ACUITY_SCORE: 13
ADLS_ACUITY_SCORE: 11

## 2019-09-06 NOTE — PROGRESS NOTES
[facility]    Medicine Progress Note - Hospitalist Service       Date of Admission:  9/5/2019    Assessment & Plan   Mr. 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 Mark Gonzalez underwent a right direct anterior approach total hip arthroplasty.  Hospitalist Service was asked to see him in consultation to assist with management of his medical comorbid conditions and postoperative state.     Right total hip arthroplasty, direct anterior approach on 09/05/2019  -- Defer analgesia, mobility, therapies, antimicrobials, pharmacologic DVT prophylaxis to the primary surgical service.      Hypertension.   Hyperlipidemia.   -- Resume PTA lisinopril.  -- Resume PTA lovastatin.      KANCHAN.   -- CPAP with home settings.     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  Entered: Viet Kenny PA-C 09/06/2019, 10:09 AM       The patient's care was discussed with the Attending Physician, Dr. Cardenas and Patient.    Viet Kenny PA-C  Hospitalist Service  [facility]    ______________________________________________________________________    Interval History   Pt seen & evaluated. Sitting up in wheelchair at bedside, denies pain. States is generally comfortable. Anticipating discharge home today.    Data reviewed today: I reviewed all medications, new labs and imaging results over  the last 24 hours. I personally reviewed no images or EKG's today.    Physical Exam   Vital Signs: Temp: 98  F (36.7  C) Temp src: Oral BP: 105/59 Pulse: 64 Heart Rate: 66 Resp: 16 SpO2: 93 % O2 Device: None (Room air) Oxygen Delivery: 2 LPM  Weight: 217 lbs 0 oz  GEN: well-developed, well-nourished, appears comfortable  HEENT: NCAT, PERRL, EOM intact bilaterally, sclera clear, conjunctiva normal, nose & mouth patent, mucous membranes moist  CHEST: lungs CTA bilaterally, no increased work of breathing, no wheeze, rales, rhonchi  HEART: RRR, S1 & S2, no murmur  ABD: soft, nontender, nondistended, no guarding or rigidity, +BS in all 4 quadrants  NEURO: awake, alert, oriented to name, place, and time. CN II-XII grossly intact. Sensation grossly intact. Muscle strength 5/5 bilaterally  SKIN: warm & dry without rash, no pedal edema    Data   Recent Labs   Lab 09/06/19  0631 09/05/19  1006   HGB 11.8* 13.1*     --    POTASSIUM 4.8 4.4   CHLORIDE 103  --    CO2 27  --    BUN 18  --    CR 0.98 0.89   ANIONGAP 6  --    SUKHI 8.5  --    *  --      Recent Results (from the past 24 hour(s))   XR Surgery OLIVIA Fluoro L/T 5 Min w Stills    Narrative    INTRAOPERATIVE FLUOROSCOPY RIGHT HIP   9/5/2019 1:45 PM    HISTORY: Right hip arthroplasty.    COMPARISON: None.    NUMBER OF IMAGES ACQUIRED: 3    VIEWS: 1    FLUOROSCOPY TIME: 0.5 minutes.      Impression    IMPRESSION: Images demonstrate various stages of a total hip  arthroplasty.     KEVIN SHAY MD   XR Pelvis w Hip Port Right 1 View    Narrative    XR PORTABLE PELVIS AND RIGHT HIP ONE VIEW   9/5/2019 3:31 PM     HISTORY: Status post total hip arthroplasty.        Impression    IMPRESSION: Right total hip arthroplasty placement. There is some  elongated heterotopic ossification along the right femoral diaphysis.     Medications     dextrose 5% and 0.45% NaCl + KCl 20 mEq/L 100 mL/hr at 09/05/19 1751       acetaminophen  975 mg Oral Q8H     aspirin  325 mg  Oral Daily     ketorolac  15 mg Intravenous Q6H     lisinopril  10 mg Oral Daily     omeprazole  20 mg Oral Daily     senna-docusate  1 tablet Oral BID    Or     senna-docusate  2 tablet Oral BID     simvastatin  5 mg Oral QPM     sodium chloride (PF)  3 mL Intracatheter Q8H

## 2019-09-06 NOTE — PLAN OF CARE
Discharge Planner PT   Patient plan for discharge: Return home.  Current status: Orders received, evaluation completed, and treatment initiated. Patient is a 85 y/o male POD # 1 R LISA. Pt lives with spouse, 3 stairs to enter/exit and a flight of stairs to access the basement. Pt independent at baseline without use of an AD. Pt performed supine-sit with SBA, sit <> stand with FWW and CGA. Pt ambulated 150 feet and 200 feet with FWW and CGA progressing to SBA. Pt navigated 6 stairs x 2 with B railings and SBA. Pt agreeable to sit up in chair at end of session.   Barriers to return to prior living situation: None indicated.  Recommendations for discharge: Return home with assist from spouse for mobility as needed.  Rationale for recommendations: Pt has demonstrated ability to safely complete mobility with SBA-CGA. Pt reports spouse will be able to assist as needed.        Entered by: Dana Mo 09/06/2019 9:45 AM     Physical Therapy Discharge Summary    Reason for therapy discharge:    Discharged to home.    Progress towards therapy goal(s). See goals on Care Plan in [facility] electronic health record for goal details.  Goals met    Therapy recommendation(s):    Continue home exercise program.

## 2019-09-06 NOTE — PLAN OF CARE
Discharge Planner OT   Patient plan for discharge: home w/ A from wife  Current status: OT eval/tx initiated today. Pt s/p R LISA, direct anterior approach.   After training, pt able to don/doff socks using A.E w/ mod I and donned undergarments w/o AE w/ mod I. Pt spv for g/h standing at sink. Pt trained in safety w/ reacher to complete high/low item retrieval. No further IP OT needs identified; will complete OT order.   Barriers to return to prior living situation: none  Recommendations for discharge: home w/ spv for bathing initially and A w/ IADL's such as transportation, cooking, cleaning prn  Rationale for recommendations: Anticipate pt will safely return home w/ above support from his wife.      Entered by: Fariha Arrieta 09/06/2019 10:55 AM     Occupational Therapy Discharge Summary    Reason for therapy discharge:    Discharged to home.    Progress towards therapy goal(s). See goals on Care Plan in [facility] electronic health record for goal details.  Goals met    Therapy recommendation(s):    No further therapy is recommended.

## 2019-09-06 NOTE — PROGRESS NOTES
09/06/19 1000   Quick Adds   Type of Visit Initial Occupational Therapy Evaluation   Living Environment   Lives With spouse   Living Arrangements house   Home Accessibility stairs to enter home;stairs within home   Number of Stairs, Main Entrance 3   Stair Railings, Main Entrance railings on both sides of stairs   Number of Stairs, Within Home, Primary other (see comments)  (12)   Stair Railings, Within Home, Primary railings on both sides of stairs   Transportation Anticipated family or friend will provide   Living Environment Comment Pt has walk-in shower    Self-Care   Usual Activity Tolerance good   Current Activity Tolerance moderate   Regular Exercise No   Equipment Currently Used at Home none   Activity/Exercise/Self-Care Comment Pt owns 2WW but did not use PTA   Functional Level   Ambulation 0-->independent   Transferring 0-->independent   Toileting 0-->independent   Bathing 1-->assistive equipment   Dressing 0-->independent   Eating 0-->independent   Communication 0-->understands/communicates without difficulty   Swallowing 0-->swallows foods/liquids without difficulty   Cognition 0 - no cognition issues reported   Fall history within last six months no   Which of the above functional risks had a recent onset or change? ambulation;transferring;toileting;bathing;dressing   Prior Functional Level Comment PTA, pt indep w/ all ADL's, IADL's, mobility. Pt drives and works as a pereyra.   General Information   Onset of Illness/Injury or Date of Surgery - Date 09/05/19   Referring Physician Dr. Martino   Patient/Family Goals Statement To return home   Additional Occupational Profile Info/Pertinent History of Current Problem Pt s/p R LISA, direct anterior approach (no hip precautions)   Precautions/Limitations fall precautions   Weight-Bearing Status - LLE weight-bearing as tolerated   Cognitive Status Examination   Orientation orientation to person, place and time   Level of Consciousness alert   Follows Commands  "(Cognition) WNL   Visual Perception   Visual Perception Wears glasses   Pain Assessment   Patient Currently in Pain No   Range of Motion (ROM)   ROM Comment BUE AROM WFL, RLE hip ROM decreased   Transfer Skill: Sit to Stand   Level of Lamar: Sit/Stand stand-by assist   Bathing   Level of Lamar minimum assist (75% patients effort)   Upper Body Dressing   Level of Lamar: Dress Upper Body independent   Lower Body Dressing   Level of Lam[place]: Dress Lower Body moderate assist (50% patients effort)   Toileting   Level of Lam[place]: Toilet stand-by assist   Grooming   Level of Lamar: Grooming stand-by assist   Eating/Self Feeding   Level of Lamar: Eating independent   General Therapy Interventions   Planned Therapy Interventions ADL retraining;IADL retraining   Clinical Impression   Criteria for Skilled Therapeutic Interventions Met yes, treatment indicated   OT Diagnosis Decreased indep w/ ADL's, IADL's, and functional mobility   Influenced by the following impairments Decreased balance, RLE ROM, functional act tolerance s/p R LISA   Assessment of Occupational Performance 1-3 Performance Deficits   Identified Performance Deficits Decreased indep w/ LB dressing, toileting, bathing, mobility, IADL's   Clinical Decision Making (Complexity) Low complexity   Therapy Frequency   (eval and 1 treatment)   Predicted Duration of Therapy Intervention (days/wks) 1 day   Anticipated Equipment Needs at Discharge sock aide   Anticipated Discharge Disposition Home with Assist   Risks and Benefits of Treatment have been explained. Yes   Patient, Family & other staff in agreement with plan of care Yes   [facility]-PAC TM \"6 Clicks\"   2016, Trustees of [facility], under license to [employer]  All rights reserved.   6 Clicks Short Forms Daily Activity Inpatient Short Form   [facility] AM-PAC  \"6 Clicks\" Daily Activity Inpatient Short Form   1. Putting on and taking off " regular lower body clothing? 2 - A Lot   2. Bathing (including washing, rinsing, drying)? 3 - A Little   3. Toileting, which includes using toilet, bedpan or urinal? 3 - A Little   4. Putting on and taking off regular upper body clothing? 4 - None   5. Taking care of personal grooming such as brushing teeth? 3 - A Little   6. Eating meals? 4 - None   Daily Activity Raw Score (Score out of 24.Lower scores equate to lower levels of function) 19   Total Evaluation Time   Total Evaluation Time (Minutes) 8

## 2019-09-06 NOTE — PROGRESS NOTES
"Orthopedic Surgery  9/6/2019  POD #1    S: Patient voices no complaints today.     O: Blood pressure 105/59, pulse 64, temperature 98  F (36.7  C), temperature source Oral, resp. rate 16, height 1.727 m (5' 8\"), weight 98.4 kg (217 lb), SpO2 93 %.  Lab Results   Component Value Date    HGB 11.8 09/06/2019     Neurovascularly intact.  Calves are negative bilaterally, both soft and nontender.  The dressing is C/D/I.    A: Mr. Villarreal is doing well status post right total hip arthroplasty.    P:   1. No dressing change, patient to remove outer dressing on POD3, leaving mesh adhesive in place.  2. Mobilize and continue physical therapy.   3. Discharge to home today.    Shi Jeffrey PA-C  [phone]  "

## 2019-09-06 NOTE — PROGRESS NOTES
09/06/19 0824   Quick Adds   Type of Visit Initial PT Evaluation   Living Environment   Lives With spouse   Living Arrangements house   Home Accessibility stairs to enter home;stairs within home   Number of Stairs, Main Entrance 3   Stair Railings, Main Entrance railings on both sides of stairs   Number of Stairs, Within Home, Primary   (12)   Stair Railings, Within Home, Primary railings on both sides of stairs   Transportation Anticipated car, drives self;family or friend will provide   Living Environment Comment Pt's spouse is able to assist as needed upon discharge.    Self-Care   Usual Activity Tolerance good   Current Activity Tolerance moderate   Regular Exercise No   Equipment Currently Used at Home none   Activity/Exercise/Self-Care Comment Pt owns FWW.    Functional Level Prior   Ambulation 0-->independent   Transferring 0-->independent   Fall history within last six months no   General Information   Onset of Illness/Injury or Date of Surgery - Date 09/05/19   Referring Physician Mark Martino MD   Patient/Family Goals Statement Return home.    Pertinent History of Current Problem (include personal factors and/or comorbidities that impact the POC) 85 y/o male POD # 1 R LISA.    Precautions/Limitations fall precautions   Weight-Bearing Status - RLE weight-bearing as tolerated   General Observations Pt in supine upon arrival of therapist.    General Info Comments Ambulate with assist.    Cognitive Status Examination   Orientation orientation to person, place and time   Level of Consciousness alert   Follows Commands and Answers Questions 100% of the time   Personal Safety and Judgment intact   Pain Assessment   Patient Currently in Pain   (Pt denied pain at rest. )   Integumentary/Edema   Integumentary/Edema Comments R hip incision covered with dressing.    Posture    Posture Comments No deficits noted.    Range of Motion (ROM)   ROM Comment Limited R hip ROM secondary to pain, otherwise B LEs WFL.   "  Strength   Strength Comments Not formally assessed, pt demonstrated sufficient B LE strength to complete mobility with use of AD.    Bed Mobility   Bed Mobility Comments Supine-sit, SBA.    Transfer Skills   Transfer Comments Sit <> stand with FWW and CGA.    Gait   Gait Comments Pt amb 10' with FWW and CGA.    Balance   Balance Comments Noted good sitting and standing balance at FWW.    Sensory Examination   Sensory Perception Comments Pt denied numbness/tingling in B LEs.    Modality Interventions   Planned Modality Interventions Cryotherapy   Planned Modality Interventions Comments PRN.   General Therapy Interventions   Planned Therapy Interventions bed mobility training;gait training;ROM;strengthening;transfer training   Clinical Impression   Criteria for Skilled Therapeutic Intervention yes, treatment indicated   PT Diagnosis Difficulty with gait.    Influenced by the following impairments Pain, Impaired R hip ROM, Decreased strength, Decreased activity tolerance   Functional limitations due to impairments Limited functional mobiltiy requiring AD and assist.    Clinical Presentation Stable/Uncomplicated   Clinical Presentation Rationale Based on PMH, current presentation, and social support.    Clinical Decision Making (Complexity) Low complexity   Therapy Frequency 2x/day   Predicted Duration of Therapy Intervention (days/wks) 1 day   Anticipated Discharge Disposition Home with Assist   Risk & Benefits of therapy have been explained Yes   Patient, Family & other staff in agreement with plan of care Yes   [facility] [employer] TM \"6 Clicks\"   2016, Trustees of [facility], under license to [employer].  All rights reserved.   6 Clicks Short Forms Basic Mobility Inpatient Short Form   [facility] [employer]  \"6 Clicks\" V.2 Basic Mobility Inpatient Short Form   1. Turning from your back to your side while in a flat bed without using bedrails? 4 - None   2. Moving from lying on your back to sitting " on the side of a flat bed without using bedrails? 3 - A Little   3. Moving to and from a bed to a chair (including a wheelchair)? 3 - A Little   4. Standing up from a chair using your arms (e.g., wheelchair, or bedside chair)? 3 - A Little   5. To walk in hospital room? 3 - A Little   6. Climbing 3-5 steps with a railing? 3 - A Little   Basic Mobility Raw Score (Score out of 24.Lower scores equate to lower levels of function) 19   Total Evaluation Time   Total Evaluation Time (Minutes) 5

## 2019-09-06 NOTE — PLAN OF CARE
Up with SBA. Pt denies pain, taking scheduled Tylenol. Wife present for discharge teaching. Given written prescriptions as pt did not think he would need to fill the narcotic prescription. Pt and wife stated they understood discharge instructions.

## 2019-09-06 NOTE — PLAN OF CARE
Pt is A&O x4. VSS on CPAP at night. CMS intact and dressing C/D/I. Up with assist of 1 using a GB and walker. Adequate urine output from the hernandez catheter. Hernandez catheter discontinued and is DTV. Progressing well per POC.

## 2019-09-07 LAB — INTERPRETATION ECG - MUSE: NORMAL

## 2019-09-09 ENCOUNTER — TELEPHONE (OUTPATIENT)
Dept: INTERNAL MEDICINE | Facility: CLINIC | Age: 84
End: 2019-09-09

## 2019-09-10 NOTE — DISCHARGE SUMMARY
"Discharge Summary    Lalit Villarreal MRN# [id number]   YOB: 1935 Age: 84 year old     Date of Admission: 9/5/2019    Date of Discharge: 9/6/2019    Reason for Admission: Lalit Villarreal is an 84 year old male who was admitted to the hospital following surgery.    Preoperative Diagnosis: DJD RIGHT HIP    Postoperative Diagnosis: DJD RIGHT HIP    Procedure Completed:  Right total hip arthroplasty    Hospital Course:  Mr. Villarreal was admitted and underwent the above procedure. The patient tolerated the procedure well. There were no complications. Following surgery he was admitted to the floor.  During his stay he did not require any blood transfusions. His pain was controlled with oral pain medication.  During his stay he progressed well in physical therapy and all the therapy goals were met.     Discharge Physical Exam:  /59 (BP Location: Left arm)   Pulse 64   Temp 98  F (36.7  C) (Oral)   Resp 16   Ht 1.727 m (5' 8\")   Wt 98.4 kg (217 lb)   SpO2 93%   BMI 32.99 kg/m    Neurovascularly intact, distal pulses present bilaterally.  Calves are negative bilaterally, both soft and nontender.    Assessment: Mr. Villarreal is stable and doing well status post Right total hip arthroplasty.    Plan: We will discharge him home on analgesics and deep venous thrombosis prophylaxis.  He will follow-up with me approximately 2 weeks from surgery.  He may call [phone] to schedule an appointment.    Meds:   Dev Villarreal   Home Medication Instructions EDWIN:[id number]    Printed on:09/10/19 3318   Medication Information                      Ascorbic Acid (VITAMIN C PO)  Take 1 tablet by mouth daily             aspirin (ASA) 325 MG tablet  Take 1 tablet (325 mg) by mouth daily             CALCIUM PO  Take 1 tablet by mouth daily             celecoxib (CELEBREX) 200 MG capsule  Take 1 capsule (200 mg) by mouth daily             Coenzyme Q10 (COQ-10 PO)  Take 1 tablet by mouth daily           "   Flaxseed, Linseed, (FLAX SEED OIL) 1000 MG capsule  Take 1 capsule by mouth daily             GLUCOSAMINE-CHONDROITIN PO  Take 2 capsules by mouth daily             lisinopril (PRINIVIL/ZESTRIL) 10 MG tablet  TAKE 1 TABLET (10 MG) BY MOUTH DAILY             lovastatin (MEVACOR) 10 MG tablet  TAKE 1 TABLET (10 MG) BY MOUTH AT BEDTIME             Omega-3 Fatty Acids (FISH OIL PO)  Take 2 capsules by mouth daily             omeprazole (PRILOSEC) 20 MG CR capsule  TAKE ONE CAPSULE BY MOUTH ONE TIME DAILY             senna (SENOKOT) 8.6 MG tablet  Take 1-2 tablets by mouth daily             vitamin B complex with vitamin C (VITAMIN  B COMPLEX) tablet  Take 1 tablet by mouth daily

## 2019-09-15 NOTE — OP NOTE
DATE OF SERVICE:  9/5/2019    SURGEON  NICOLE FINNEGAN M.D.    ASSISTANT  Shi Garcia PA-C    PREOPERATIVE DIAGNOSIS   Right hip osteoarthritis, failed to respond to conservative management.    POSTOPERATIVE DIAGNOSIS   Right hip osteoarthritis, failed to respond to conservative management.    TITLE OF PROCEDURE   Right total hip arthroplasty, Depuy uncemented components, direct anterior approach.    PROCEDURE  The patient was brought to the operating room and after satisfactory anesthesia was placed on the Leblanc table. The right  lower extremity was then prepped and draped in the usual sterile fashion. Image intensification was utilized during the case for component positioning as well as leg length assessment. An incision was made just lateral to the ASIS and coursing toward the proximal femur. Dissection was carried down to the TFL. The fascia overlying the TFL was incised and dissection was carried down to the interval between TFL and rectus femoris. This was identified. A lateral cobra retractor was placed followed by a medial cobra around the femoral neck. The leash vessels, anterior circumflex, was identified and was coagulated. The underlying fascia was released. Dissection was carried down to the hip capsule. Capsule was identified and a capsulotomy was performed in a T-type fashion first along the intertrochanteric line and then secondarily up along the femoral neck and head, finally completed by releasing along the saddle of the trochanter. The capsular edges were tagged for later repair. The hip was then externally rotated and medial capsular release was performed such that the lesser trochanter could be palpated. Following this, the femoral neck was osteotomized as per the preoperative plan. The femoral head was removed with a corkscrew without difficulty. The acetabulum was exposed and was reamed sequentially up to 56 mm. This was reamed under both direct visualization as well as  the aid of image intensification. A 56 mm [place] cup was impacted into place in approximately 40 to 45 degrees of abduction, and 15 degrees of anteversion. Two screws were placed and this gave excellent fixation. The 36 mm  neutral liner was impacted into place.     Attention was then directed to the femur. The hook was placed underneath the proximal aspect of the femur between the trochanteric ridge and gluteus edwin. The hip was then brought into external rotation, adduction, and extension. The femur was then carefully elevated using the appropriate releases off the inside of the greater trochanter. Once the femur was elevated, a starter broach was placed followed by sequential broaches. The broaches were performed up to size 5 Actis, which gave excellent torsinal as well as axial stability. Trial reduction was performed with a high offset +5mm head. The hip was reduced and with hip reduction the combined anteversion looked excellent. The hip was brought into full extension and external rotation. There was no evidence of instability. As well, x-rays were printed and compared with the opposite side and found to have good length and restoration of offset.  It was felt that an additional stem size could not be placed.  The hip was then dislocated and then the proximal femur was then brought back up into the proximal aspect of the wound. The real size 5 actis stem, high offset was impacted into place. Again this gave excellent torsion as well as axial stability. The real +5mm ceramic biolox head was impacted into place and the hip was reduced again. The image intensification confirmed excellent position of the components.   A 3 minute dilute betadine soak was performed.  The wound was thoroughly irrigated. The capsule was closed with interrupted 0 Vicryl suture and tissues infiltrated with toradol/marcaine mixture. The tensor fascia was closed with a running 0 Stratafix suture. The subcutaneous layer was closed  with interrupted 2-0 Vicryl, 2-0 Stratafix, and 3-0 subcuticular monocryl was placed followed by mesh dressing with skin glue. One gram of vancomycin powder was placed deep and superficial prior to closure.  Sterile dressing was applied. The patient left the operating room in satisfactory condition. Patient received 1 gm of tranexamic acid pre-op and at closure.

## 2022-02-17 PROBLEM — K21.9 GASTROESOPHAGEAL REFLUX DISEASE: Status: ACTIVE | Noted: 2017-07-05

## (undated) DEVICE — SU ETHIBOND 2 V-37 4X30" MX69G

## (undated) DEVICE — ESU BIPOLAR SEALER AQUAMANTYS 6MM 23-112-1

## (undated) DEVICE — KIT PATIENT CARE HANA TABLE PROFX SUPINE 6855

## (undated) DEVICE — SU STRATAFIX PDS PLUS 2-0 SPIRAL CT-1 30CM SXPP1B410

## (undated) DEVICE — DRAPE STERI U 1015

## (undated) DEVICE — SOL NACL 0.9% IRRIG 1000ML BOTTLE 2F7124

## (undated) DEVICE — ENDO FORCEP BX CAPTURA JUMBO SPIKE 2.8MMX230CM G53042

## (undated) DEVICE — GLOVE PROTEXIS W/NEU-THERA 6.5  2D73TE65

## (undated) DEVICE — SU STRATAFIX PDS PLUS 0 CT-1 30CM SXPP1B450

## (undated) DEVICE — SU MONOCRYL 3-0 PS-2 27" Y427H

## (undated) DEVICE — WIPES FOLEY CARE SURESTEP PROVON DFC100

## (undated) DEVICE — GLOVE PROTEXIS POWDER FREE 7.5 ORTHOPEDIC 2D73ET75

## (undated) DEVICE — GLOVE PROTEXIS POWDER FREE 8.5 ORTHOPEDIC 2D73ET85

## (undated) DEVICE — DRAPE C-ARM 60X42" 1013

## (undated) DEVICE — Device

## (undated) DEVICE — DRAPE IOBAN ISOLATION VERTICAL 320X21CM 6617

## (undated) DEVICE — SOL NACL 0.9% INJ 250ML BAG 2B1322Q

## (undated) DEVICE — CATH TRAY FOLEY COUDE SURESTEP 16FR W/URNE MTR STLK A304716A

## (undated) DEVICE — DRAPE SHEET REV FOLD 3/4 9349

## (undated) DEVICE — BLADE SAW SAGITTAL STRK 19.5X95X1.27MM 2108-109-000S15

## (undated) DEVICE — SUCTION TIP YANKAUER STR K87

## (undated) DEVICE — PACK TOTAL HIP W/U DRAPE SOP15HUFSC

## (undated) DEVICE — NDL 21GA 1.5"

## (undated) DEVICE — LINEN TOWEL PACK X5 5464

## (undated) DEVICE — HOOD FLYTE W/PEELAWAY 408-800-100

## (undated) DEVICE — SUCTION IRR SYSTEM W/O TIP INTERPULSE HANDPIECE 0210-100-000

## (undated) DEVICE — ESU GROUND PAD UNIVERSAL W/O CORD

## (undated) DEVICE — MANIFOLD NEPTUNE 4 PORT 700-20

## (undated) DEVICE — GLOVE PROTEXIS BLUE W/NEU-THERA 8.0  2D73EB80

## (undated) DEVICE — SU VICRYL 2-0 CP-1 27" UND J266H

## (undated) DEVICE — GLOVE PROTEXIS BLUE W/NEU-THERA 7.0  2D73EB70

## (undated) DEVICE — GLOVE PROTEXIS MICRO 6.5  2D73PM65

## (undated) DEVICE — CLOSURE SYS SKIN PREMIERPRO EXOFIN FUSION 4X22CM STRL 3472

## (undated) DEVICE — KIT ENDO TURNOVER/PROCEDURE W/CLEAN A SCOPE LINERS 103888

## (undated) DEVICE — SYR 50ML LL W/O NDL 309653

## (undated) DEVICE — SU VICRYL 0 CTX CR 8X18" J764D

## (undated) DEVICE — ESU PENCIL W/SMOKE EVAC NEPTUNE STRYKER 0703-046-000

## (undated) DEVICE — SOL BENZOIN 0.5OZ

## (undated) DEVICE — SU DERMABOND PRINEO 22CM CLR222US

## (undated) DEVICE — BLADE KNIFE SURG 10 371110

## (undated) DEVICE — PREP CHLORAPREP 26ML TINTED ORANGE  260815

## (undated) DEVICE — BONE CLEANING TIP INTERPULSE  0210-010-000

## (undated) DEVICE — DRAPE CONVERTORS U-DRAPE 60X72" 8476

## (undated) DEVICE — SOL WATER IRRIG 1000ML BOTTLE 2F7114

## (undated) DEVICE — GLOVE PROTEXIS W/NEU-THERA 8.5  2D73TE85

## (undated) DEVICE — DRAPE IOBAN INCISE 23X17" 6650EZ

## (undated) DEVICE — GLOVE PROTEXIS W/NEU-THERA 8.0  2D73TE80

## (undated) DEVICE — SOLUTION WOUND CLEANSING 3/4OZ 10% PVP EA-L3011FB-50

## (undated) RX ORDER — ONDANSETRON 2 MG/ML
INJECTION INTRAMUSCULAR; INTRAVENOUS
Status: DISPENSED
Start: 2019-09-05

## (undated) RX ORDER — PROPOFOL 10 MG/ML
INJECTION, EMULSION INTRAVENOUS
Status: DISPENSED
Start: 2019-09-05

## (undated) RX ORDER — DEXAMETHASONE SODIUM PHOSPHATE 4 MG/ML
INJECTION, SOLUTION INTRA-ARTICULAR; INTRALESIONAL; INTRAMUSCULAR; INTRAVENOUS; SOFT TISSUE
Status: DISPENSED
Start: 2019-09-05

## (undated) RX ORDER — PREGABALIN 150 MG/1
CAPSULE ORAL
Status: DISPENSED
Start: 2019-09-05

## (undated) RX ORDER — HYDROMORPHONE HYDROCHLORIDE 1 MG/ML
INJECTION, SOLUTION INTRAMUSCULAR; INTRAVENOUS; SUBCUTANEOUS
Status: DISPENSED
Start: 2019-09-05

## (undated) RX ORDER — CEFAZOLIN SODIUM 2 G/100ML
INJECTION, SOLUTION INTRAVENOUS
Status: DISPENSED
Start: 2019-09-05

## (undated) RX ORDER — FENTANYL CITRATE 50 UG/ML
INJECTION, SOLUTION INTRAMUSCULAR; INTRAVENOUS
Status: DISPENSED
Start: 2019-09-05

## (undated) RX ORDER — VECURONIUM BROMIDE 1 MG/ML
INJECTION, POWDER, LYOPHILIZED, FOR SOLUTION INTRAVENOUS
Status: DISPENSED
Start: 2019-09-05

## (undated) RX ORDER — ACETAMINOPHEN 500 MG
TABLET ORAL
Status: DISPENSED
Start: 2019-09-05

## (undated) RX ORDER — CEFAZOLIN SODIUM 1 G/3ML
INJECTION, POWDER, FOR SOLUTION INTRAMUSCULAR; INTRAVENOUS
Status: DISPENSED
Start: 2019-09-05

## (undated) RX ORDER — LIDOCAINE HYDROCHLORIDE 20 MG/ML
INJECTION, SOLUTION EPIDURAL; INFILTRATION; INTRACAUDAL; PERINEURAL
Status: DISPENSED
Start: 2019-09-05

## (undated) RX ORDER — CELECOXIB 200 MG/1
CAPSULE ORAL
Status: DISPENSED
Start: 2019-09-05

## (undated) RX ORDER — FENTANYL CITRATE 50 UG/ML
INJECTION, SOLUTION INTRAMUSCULAR; INTRAVENOUS
Status: DISPENSED
Start: 2019-05-29

## (undated) RX ORDER — PHENYLEPHRINE HCL IN 0.9% NACL 50MG/250ML
PLASTIC BAG, INJECTION (ML) INTRAVENOUS
Status: DISPENSED
Start: 2019-09-05